# Patient Record
Sex: FEMALE | Race: WHITE | Employment: FULL TIME | URBAN - METROPOLITAN AREA
[De-identification: names, ages, dates, MRNs, and addresses within clinical notes are randomized per-mention and may not be internally consistent; named-entity substitution may affect disease eponyms.]

---

## 2017-03-20 ENCOUNTER — OFFICE VISIT (OUTPATIENT)
Dept: FAMILY MEDICINE CLINIC | Age: 47
End: 2017-03-20

## 2017-03-20 VITALS
HEART RATE: 89 BPM | WEIGHT: 159.2 LBS | SYSTOLIC BLOOD PRESSURE: 130 MMHG | HEIGHT: 62 IN | DIASTOLIC BLOOD PRESSURE: 80 MMHG | BODY MASS INDEX: 29.3 KG/M2 | OXYGEN SATURATION: 97 %

## 2017-03-20 DIAGNOSIS — E11.9 TYPE 2 DIABETES MELLITUS WITHOUT COMPLICATION, WITHOUT LONG-TERM CURRENT USE OF INSULIN (HCC): ICD-10-CM

## 2017-03-20 DIAGNOSIS — E03.9 HYPOTHYROIDISM, UNSPECIFIED TYPE: ICD-10-CM

## 2017-03-20 LAB
A/G RATIO: 1.9 (ref 1.1–2.2)
ALBUMIN SERPL-MCNC: 4.5 G/DL (ref 3.4–5)
ALP BLD-CCNC: 76 U/L (ref 40–129)
ALT SERPL-CCNC: 48 U/L (ref 10–40)
ANION GAP SERPL CALCULATED.3IONS-SCNC: 14 MMOL/L (ref 3–16)
AST SERPL-CCNC: 41 U/L (ref 15–37)
BASOPHILS ABSOLUTE: 0 K/UL (ref 0–0.2)
BASOPHILS RELATIVE PERCENT: 0.5 %
BILIRUB SERPL-MCNC: 0.3 MG/DL (ref 0–1)
BUN BLDV-MCNC: 7 MG/DL (ref 7–20)
CALCIUM SERPL-MCNC: 8.9 MG/DL (ref 8.3–10.6)
CHLORIDE BLD-SCNC: 102 MMOL/L (ref 99–110)
CHOLESTEROL, TOTAL: 138 MG/DL (ref 0–199)
CO2: 25 MMOL/L (ref 21–32)
CREAT SERPL-MCNC: 0.7 MG/DL (ref 0.6–1.1)
CREATININE URINE: 141.1 MG/DL (ref 28–259)
EOSINOPHILS ABSOLUTE: 0.1 K/UL (ref 0–0.6)
EOSINOPHILS RELATIVE PERCENT: 2.5 %
GFR AFRICAN AMERICAN: >60
GFR NON-AFRICAN AMERICAN: >60
GLOBULIN: 2.4 G/DL
GLUCOSE BLD-MCNC: 189 MG/DL (ref 70–99)
HCT VFR BLD CALC: 38.3 % (ref 36–48)
HDLC SERPL-MCNC: 24 MG/DL (ref 40–60)
HEMOGLOBIN: 12.7 G/DL (ref 12–16)
LDL CHOLESTEROL CALCULATED: ABNORMAL MG/DL
LDL CHOLESTEROL DIRECT: 52 MG/DL
LYMPHOCYTES ABSOLUTE: 2.1 K/UL (ref 1–5.1)
LYMPHOCYTES RELATIVE PERCENT: 34.2 %
MCH RBC QN AUTO: 27.1 PG (ref 26–34)
MCHC RBC AUTO-ENTMCNC: 33.2 G/DL (ref 31–36)
MCV RBC AUTO: 81.5 FL (ref 80–100)
MICROALBUMIN UR-MCNC: <1.2 MG/DL
MICROALBUMIN/CREAT UR-RTO: NORMAL MG/G (ref 0–30)
MONOCYTES ABSOLUTE: 0.3 K/UL (ref 0–1.3)
MONOCYTES RELATIVE PERCENT: 5.1 %
NEUTROPHILS ABSOLUTE: 3.5 K/UL (ref 1.7–7.7)
NEUTROPHILS RELATIVE PERCENT: 57.7 %
PDW BLD-RTO: 14.7 % (ref 12.4–15.4)
PLATELET # BLD: 235 K/UL (ref 135–450)
PMV BLD AUTO: 8 FL (ref 5–10.5)
POTASSIUM SERPL-SCNC: 4.1 MMOL/L (ref 3.5–5.1)
RBC # BLD: 4.7 M/UL (ref 4–5.2)
SODIUM BLD-SCNC: 141 MMOL/L (ref 136–145)
T4 FREE: 1.2 NG/DL (ref 0.9–1.8)
TOTAL PROTEIN: 6.9 G/DL (ref 6.4–8.2)
TRIGL SERPL-MCNC: 388 MG/DL (ref 0–150)
TSH SERPL DL<=0.05 MIU/L-ACNC: 1.94 UIU/ML (ref 0.27–4.2)
VLDLC SERPL CALC-MCNC: ABNORMAL MG/DL
WBC # BLD: 6 K/UL (ref 4–11)

## 2017-03-20 PROCEDURE — 99213 OFFICE O/P EST LOW 20 MIN: CPT | Performed by: FAMILY MEDICINE

## 2017-03-20 RX ORDER — METFORMIN HYDROCHLORIDE 500 MG/1
1000 TABLET, EXTENDED RELEASE ORAL
Qty: 360 TABLET | Refills: 3 | Status: SHIPPED | OUTPATIENT
Start: 2017-03-20 | End: 2018-07-22 | Stop reason: SDUPTHER

## 2017-03-20 ASSESSMENT — ENCOUNTER SYMPTOMS
SINUS PRESSURE: 0
RHINORRHEA: 0
EYE REDNESS: 0
APNEA: 0
STRIDOR: 0
BLOOD IN STOOL: 0
VOMITING: 0
EYE PAIN: 0
NAUSEA: 0
RECTAL PAIN: 0
FACIAL SWELLING: 0
CHEST TIGHTNESS: 0
COUGH: 0
ABDOMINAL DISTENTION: 0
ANAL BLEEDING: 0
VOICE CHANGE: 0
CHOKING: 0
PHOTOPHOBIA: 0
COLOR CHANGE: 0
SHORTNESS OF BREATH: 0
SORE THROAT: 0
CONSTIPATION: 0
BACK PAIN: 0
TROUBLE SWALLOWING: 0
EYE ITCHING: 0
EYE DISCHARGE: 0
WHEEZING: 0
DIARRHEA: 0
ABDOMINAL PAIN: 0

## 2017-03-21 LAB
ESTIMATED AVERAGE GLUCOSE: 254.7 MG/DL
HBA1C MFR BLD: 10.5 %

## 2017-06-16 ENCOUNTER — OFFICE VISIT (OUTPATIENT)
Dept: FAMILY MEDICINE CLINIC | Age: 47
End: 2017-06-16

## 2017-06-16 VITALS
OXYGEN SATURATION: 99 % | SYSTOLIC BLOOD PRESSURE: 120 MMHG | WEIGHT: 157.1 LBS | HEIGHT: 62 IN | TEMPERATURE: 98.4 F | BODY MASS INDEX: 28.91 KG/M2 | DIASTOLIC BLOOD PRESSURE: 78 MMHG | HEART RATE: 70 BPM

## 2017-06-16 DIAGNOSIS — K21.9 GASTROESOPHAGEAL REFLUX DISEASE WITHOUT ESOPHAGITIS: ICD-10-CM

## 2017-06-16 DIAGNOSIS — E11.9 TYPE 2 DIABETES MELLITUS WITHOUT COMPLICATION, WITHOUT LONG-TERM CURRENT USE OF INSULIN (HCC): ICD-10-CM

## 2017-06-16 DIAGNOSIS — I10 ESSENTIAL HYPERTENSION, BENIGN: ICD-10-CM

## 2017-06-16 DIAGNOSIS — E03.9 HYPOTHYROIDISM, UNSPECIFIED TYPE: ICD-10-CM

## 2017-06-16 DIAGNOSIS — E04.1 THYROID NODULE: ICD-10-CM

## 2017-06-16 LAB
ANION GAP SERPL CALCULATED.3IONS-SCNC: 16 MMOL/L (ref 3–16)
BUN BLDV-MCNC: 10 MG/DL (ref 7–20)
CALCIUM SERPL-MCNC: 9.5 MG/DL (ref 8.3–10.6)
CHLORIDE BLD-SCNC: 98 MMOL/L (ref 99–110)
CO2: 25 MMOL/L (ref 21–32)
CREAT SERPL-MCNC: 0.9 MG/DL (ref 0.6–1.1)
GFR AFRICAN AMERICAN: >60
GFR NON-AFRICAN AMERICAN: >60
GLUCOSE BLD-MCNC: 138 MG/DL (ref 70–99)
POTASSIUM SERPL-SCNC: 4.2 MMOL/L (ref 3.5–5.1)
SODIUM BLD-SCNC: 139 MMOL/L (ref 136–145)

## 2017-06-16 PROCEDURE — 99214 OFFICE O/P EST MOD 30 MIN: CPT | Performed by: FAMILY MEDICINE

## 2017-06-16 ASSESSMENT — ENCOUNTER SYMPTOMS
SHORTNESS OF BREATH: 0
WHEEZING: 0
APNEA: 0
EYE DISCHARGE: 0
COLOR CHANGE: 0
VOMITING: 0
SINUS PRESSURE: 0
RECTAL PAIN: 0
STRIDOR: 0
TROUBLE SWALLOWING: 0
SORE THROAT: 0
BLOOD IN STOOL: 0
RHINORRHEA: 0
NAUSEA: 0
PHOTOPHOBIA: 0
CONSTIPATION: 0
DIARRHEA: 0
EYE PAIN: 0
EYE ITCHING: 0
BACK PAIN: 0
CHEST TIGHTNESS: 0
ABDOMINAL PAIN: 0
CHOKING: 0
ABDOMINAL DISTENTION: 0
EYE REDNESS: 0
ANAL BLEEDING: 0
COUGH: 0
FACIAL SWELLING: 0
VOICE CHANGE: 0

## 2017-06-17 LAB
ESTIMATED AVERAGE GLUCOSE: 197.3 MG/DL
HBA1C MFR BLD: 8.5 %

## 2017-06-20 ENCOUNTER — TELEPHONE (OUTPATIENT)
Dept: FAMILY MEDICINE CLINIC | Age: 47
End: 2017-06-20

## 2017-06-20 DIAGNOSIS — E11.9 TYPE 2 DIABETES MELLITUS WITHOUT COMPLICATION, WITHOUT LONG-TERM CURRENT USE OF INSULIN (HCC): Primary | ICD-10-CM

## 2017-08-21 RX ORDER — LEVOTHYROXINE SODIUM 0.05 MG/1
TABLET ORAL
Qty: 90 TABLET | Refills: 1 | Status: SHIPPED | OUTPATIENT
Start: 2017-08-21 | End: 2018-04-21 | Stop reason: SDUPTHER

## 2017-08-21 RX ORDER — ATENOLOL 25 MG/1
TABLET ORAL
Qty: 90 TABLET | Refills: 1 | Status: SHIPPED | OUTPATIENT
Start: 2017-08-21 | End: 2018-04-21

## 2018-04-21 RX ORDER — ATENOLOL 25 MG/1
TABLET ORAL
Qty: 90 TABLET | Refills: 1 | Status: SHIPPED | OUTPATIENT
Start: 2018-04-21 | End: 2018-11-20 | Stop reason: SDUPTHER

## 2018-04-21 RX ORDER — LEVOTHYROXINE SODIUM 0.05 MG/1
TABLET ORAL
Qty: 90 TABLET | Refills: 0 | Status: SHIPPED | OUTPATIENT
Start: 2018-04-21 | End: 2018-07-22 | Stop reason: SDUPTHER

## 2018-07-22 RX ORDER — METFORMIN HYDROCHLORIDE 500 MG/1
TABLET, EXTENDED RELEASE ORAL
Qty: 360 TABLET | Refills: 2 | Status: SHIPPED | OUTPATIENT
Start: 2018-07-22 | End: 2019-05-28 | Stop reason: SDUPTHER

## 2018-07-23 RX ORDER — LEVOTHYROXINE SODIUM 0.05 MG/1
TABLET ORAL
Qty: 90 TABLET | Refills: 0 | Status: SHIPPED | OUTPATIENT
Start: 2018-07-23 | End: 2018-11-20 | Stop reason: SDUPTHER

## 2018-11-20 RX ORDER — LEVOTHYROXINE SODIUM 0.05 MG/1
TABLET ORAL
Qty: 90 TABLET | Refills: 0 | Status: SHIPPED | OUTPATIENT
Start: 2018-11-20 | End: 2019-03-12 | Stop reason: SDUPTHER

## 2018-11-20 RX ORDER — ATENOLOL 25 MG/1
TABLET ORAL
Qty: 90 TABLET | Refills: 0 | Status: SHIPPED | OUTPATIENT
Start: 2018-11-20 | End: 2019-03-12 | Stop reason: SDUPTHER

## 2018-11-27 ENCOUNTER — OFFICE VISIT (OUTPATIENT)
Dept: FAMILY MEDICINE CLINIC | Age: 48
End: 2018-11-27
Payer: COMMERCIAL

## 2018-11-27 VITALS
DIASTOLIC BLOOD PRESSURE: 82 MMHG | HEIGHT: 62 IN | SYSTOLIC BLOOD PRESSURE: 128 MMHG | OXYGEN SATURATION: 100 % | BODY MASS INDEX: 26.5 KG/M2 | WEIGHT: 144 LBS | HEART RATE: 94 BPM

## 2018-11-27 DIAGNOSIS — E03.9 HYPOTHYROIDISM, UNSPECIFIED TYPE: ICD-10-CM

## 2018-11-27 DIAGNOSIS — E55.9 VITAMIN D DEFICIENCY: ICD-10-CM

## 2018-11-27 DIAGNOSIS — I10 ESSENTIAL HYPERTENSION, BENIGN: ICD-10-CM

## 2018-11-27 DIAGNOSIS — K21.9 GASTROESOPHAGEAL REFLUX DISEASE WITHOUT ESOPHAGITIS: ICD-10-CM

## 2018-11-27 DIAGNOSIS — D32.9 MENINGIOMA (HCC): ICD-10-CM

## 2018-11-27 DIAGNOSIS — E11.9 TYPE 2 DIABETES MELLITUS WITHOUT COMPLICATION, WITHOUT LONG-TERM CURRENT USE OF INSULIN (HCC): ICD-10-CM

## 2018-11-27 DIAGNOSIS — Z01.818 PRE-OP EXAM: Primary | ICD-10-CM

## 2018-11-27 LAB
A/G RATIO: 2 (ref 1.1–2.2)
ALBUMIN SERPL-MCNC: 4.9 G/DL (ref 3.4–5)
ALP BLD-CCNC: 68 U/L (ref 40–129)
ALT SERPL-CCNC: 40 U/L (ref 10–40)
ANION GAP SERPL CALCULATED.3IONS-SCNC: 12 MMOL/L (ref 3–16)
AST SERPL-CCNC: 27 U/L (ref 15–37)
BASOPHILS ABSOLUTE: 0 K/UL (ref 0–0.2)
BASOPHILS RELATIVE PERCENT: 0.3 %
BILIRUB SERPL-MCNC: 0.5 MG/DL (ref 0–1)
BUN BLDV-MCNC: 15 MG/DL (ref 7–20)
CALCIUM SERPL-MCNC: 9.4 MG/DL (ref 8.3–10.6)
CHLORIDE BLD-SCNC: 102 MMOL/L (ref 99–110)
CO2: 26 MMOL/L (ref 21–32)
CREAT SERPL-MCNC: 0.8 MG/DL (ref 0.6–1.1)
CREATININE URINE: 159.7 MG/DL (ref 28–259)
EOSINOPHILS ABSOLUTE: 0.1 K/UL (ref 0–0.6)
EOSINOPHILS RELATIVE PERCENT: 1.5 %
GFR AFRICAN AMERICAN: >60
GFR NON-AFRICAN AMERICAN: >60
GLOBULIN: 2.4 G/DL
GLUCOSE BLD-MCNC: 121 MG/DL (ref 70–99)
HCT VFR BLD CALC: 39.2 % (ref 36–48)
HEMOGLOBIN: 12.8 G/DL (ref 12–16)
LYMPHOCYTES ABSOLUTE: 1.8 K/UL (ref 1–5.1)
LYMPHOCYTES RELATIVE PERCENT: 29.2 %
MCH RBC QN AUTO: 26.8 PG (ref 26–34)
MCHC RBC AUTO-ENTMCNC: 32.7 G/DL (ref 31–36)
MCV RBC AUTO: 82 FL (ref 80–100)
MICROALBUMIN UR-MCNC: <1.2 MG/DL
MICROALBUMIN/CREAT UR-RTO: NORMAL MG/G (ref 0–30)
MONOCYTES ABSOLUTE: 0.4 K/UL (ref 0–1.3)
MONOCYTES RELATIVE PERCENT: 6.2 %
NEUTROPHILS ABSOLUTE: 3.8 K/UL (ref 1.7–7.7)
NEUTROPHILS RELATIVE PERCENT: 62.8 %
PDW BLD-RTO: 14.8 % (ref 12.4–15.4)
PLATELET # BLD: 261 K/UL (ref 135–450)
PMV BLD AUTO: 7.5 FL (ref 5–10.5)
POTASSIUM SERPL-SCNC: 4.5 MMOL/L (ref 3.5–5.1)
RBC # BLD: 4.78 M/UL (ref 4–5.2)
SODIUM BLD-SCNC: 140 MMOL/L (ref 136–145)
TOTAL PROTEIN: 7.3 G/DL (ref 6.4–8.2)
TSH SERPL DL<=0.05 MIU/L-ACNC: 2.35 UIU/ML (ref 0.27–4.2)
VITAMIN D 25-HYDROXY: 21 NG/ML
WBC # BLD: 6 K/UL (ref 4–11)

## 2018-11-27 PROCEDURE — 93000 ELECTROCARDIOGRAM COMPLETE: CPT | Performed by: FAMILY MEDICINE

## 2018-11-27 PROCEDURE — 99244 OFF/OP CNSLTJ NEW/EST MOD 40: CPT | Performed by: FAMILY MEDICINE

## 2018-11-27 ASSESSMENT — PATIENT HEALTH QUESTIONNAIRE - PHQ9
SUM OF ALL RESPONSES TO PHQ QUESTIONS 1-9: 0
SUM OF ALL RESPONSES TO PHQ QUESTIONS 1-9: 0
SUM OF ALL RESPONSES TO PHQ9 QUESTIONS 1 & 2: 0
1. LITTLE INTEREST OR PLEASURE IN DOING THINGS: 0
2. FEELING DOWN, DEPRESSED OR HOPELESS: 0

## 2018-11-27 NOTE — PROGRESS NOTES
Preoperative workup as follows ECG, hemoglobin, hematocrit, electrolytes, creatinine, glucose, liver function studies, coagulation studies  2. Change in medication regimen before surgery: none, continue med regimen including morning of surgery, w/sip of water  3. Prophylaxis for cardiac events with perioperative beta-blockers: on atenol  4. Invasive hemodynamic monitoring perioperatively: not indicated  5. Deep vein thrombosis prophylaxis postoperatively:regimen to be chosen by surgical team  6. Surveillance for postoperative MI with ECG immediately postoperatively and on postoperative days 1 and 2 AND troponin levels 24 hours postoperatively and on day 4 or hospital discharge (whichever comes first): not indicated  7.  Other measures: will follow with you

## 2018-11-28 LAB
ESTIMATED AVERAGE GLUCOSE: 157.1 MG/DL
HBA1C MFR BLD: 7.1 %

## 2018-12-04 ENCOUNTER — HOSPITAL ENCOUNTER (OUTPATIENT)
Dept: MRI IMAGING | Age: 48
Discharge: HOME | End: 2018-12-04
Payer: COMMERCIAL

## 2018-12-04 DIAGNOSIS — D32.9 MENINGIOMA (HCC): ICD-10-CM

## 2018-12-04 PROCEDURE — 6360000004 HC RX CONTRAST MEDICATION: Performed by: NEUROLOGICAL SURGERY

## 2018-12-04 PROCEDURE — 70552 MRI BRAIN STEM W/DYE: CPT

## 2018-12-04 PROCEDURE — A9579 GAD-BASE MR CONTRAST NOS,1ML: HCPCS | Performed by: NEUROLOGICAL SURGERY

## 2018-12-04 RX ADMIN — GADOTERIDOL 13 ML: 279.3 INJECTION, SOLUTION INTRAVENOUS at 09:50

## 2018-12-10 ENCOUNTER — ANESTHESIA EVENT (OUTPATIENT)
Dept: OPERATING ROOM | Age: 48
DRG: 027 | End: 2018-12-10
Payer: COMMERCIAL

## 2018-12-11 ENCOUNTER — HOSPITAL ENCOUNTER (INPATIENT)
Age: 48
LOS: 2 days | Discharge: HOME | DRG: 027 | End: 2018-12-13
Attending: NEUROLOGICAL SURGERY | Admitting: NEUROLOGICAL SURGERY
Payer: COMMERCIAL

## 2018-12-11 ENCOUNTER — ANESTHESIA (OUTPATIENT)
Dept: OPERATING ROOM | Age: 48
DRG: 027 | End: 2018-12-11
Payer: COMMERCIAL

## 2018-12-11 VITALS — OXYGEN SATURATION: 100 % | DIASTOLIC BLOOD PRESSURE: 69 MMHG | SYSTOLIC BLOOD PRESSURE: 113 MMHG | TEMPERATURE: 98.4 F

## 2018-12-11 DIAGNOSIS — D32.9 MENINGIOMA (HCC): Primary | ICD-10-CM

## 2018-12-11 LAB
ABO/RH: NORMAL
ALBUMIN SERPL-MCNC: 4 G/DL (ref 3.4–5)
ANION GAP SERPL CALCULATED.3IONS-SCNC: 13 MMOL/L (ref 3–16)
ANTIBODY SCREEN: NORMAL
APTT: 36.6 SEC (ref 26–36)
BASOPHILS ABSOLUTE: 0 K/UL (ref 0–0.2)
BASOPHILS RELATIVE PERCENT: 0.1 %
BUN BLDV-MCNC: 11 MG/DL (ref 7–20)
CALCIUM SERPL-MCNC: 8.6 MG/DL (ref 8.3–10.6)
CHLORIDE BLD-SCNC: 101 MMOL/L (ref 99–110)
CO2: 23 MMOL/L (ref 21–32)
CREAT SERPL-MCNC: 0.7 MG/DL (ref 0.6–1.1)
EOSINOPHILS ABSOLUTE: 0 K/UL (ref 0–0.6)
EOSINOPHILS RELATIVE PERCENT: 0.1 %
GFR AFRICAN AMERICAN: >60
GFR NON-AFRICAN AMERICAN: >60
GLUCOSE BLD-MCNC: 114 MG/DL (ref 70–99)
GLUCOSE BLD-MCNC: 120 MG/DL (ref 70–99)
GLUCOSE BLD-MCNC: 143 MG/DL (ref 70–99)
GLUCOSE BLD-MCNC: 167 MG/DL (ref 70–99)
GLUCOSE BLD-MCNC: 213 MG/DL (ref 70–99)
GLUCOSE BLD-MCNC: 223 MG/DL (ref 70–99)
HCT VFR BLD CALC: 32.2 % (ref 36–48)
HEMOGLOBIN: 10.5 G/DL (ref 12–16)
INR BLD: 1 (ref 0.86–1.14)
LYMPHOCYTES ABSOLUTE: 0.6 K/UL (ref 1–5.1)
LYMPHOCYTES RELATIVE PERCENT: 9.8 %
MCH RBC QN AUTO: 26.8 PG (ref 26–34)
MCHC RBC AUTO-ENTMCNC: 32.7 G/DL (ref 31–36)
MCV RBC AUTO: 82.1 FL (ref 80–100)
MONOCYTES ABSOLUTE: 0.1 K/UL (ref 0–1.3)
MONOCYTES RELATIVE PERCENT: 1.4 %
NEUTROPHILS ABSOLUTE: 5.7 K/UL (ref 1.7–7.7)
NEUTROPHILS RELATIVE PERCENT: 88.6 %
PDW BLD-RTO: 14.5 % (ref 12.4–15.4)
PERFORMED ON: ABNORMAL
PHOSPHORUS: 4.1 MG/DL (ref 2.5–4.9)
PLATELET # BLD: 183 K/UL (ref 135–450)
PMV BLD AUTO: 7.7 FL (ref 5–10.5)
POTASSIUM SERPL-SCNC: 3.8 MMOL/L (ref 3.5–5.1)
PREGNANCY, URINE: NEGATIVE
PROTHROMBIN TIME: 11.4 SEC (ref 9.8–13)
RBC # BLD: 3.92 M/UL (ref 4–5.2)
SODIUM BLD-SCNC: 137 MMOL/L (ref 136–145)
WBC # BLD: 6.4 K/UL (ref 4–11)

## 2018-12-11 PROCEDURE — 88360 TUMOR IMMUNOHISTOCHEM/MANUAL: CPT

## 2018-12-11 PROCEDURE — 86901 BLOOD TYPING SEROLOGIC RH(D): CPT

## 2018-12-11 PROCEDURE — 6370000000 HC RX 637 (ALT 250 FOR IP): Performed by: NEUROLOGICAL SURGERY

## 2018-12-11 PROCEDURE — 2500000003 HC RX 250 WO HCPCS: Performed by: ANESTHESIOLOGY

## 2018-12-11 PROCEDURE — 2500000003 HC RX 250 WO HCPCS: Performed by: NEUROLOGICAL SURGERY

## 2018-12-11 PROCEDURE — 3700000001 HC ADD 15 MINUTES (ANESTHESIA): Performed by: NEUROLOGICAL SURGERY

## 2018-12-11 PROCEDURE — 6370000000 HC RX 637 (ALT 250 FOR IP): Performed by: NURSE PRACTITIONER

## 2018-12-11 PROCEDURE — 2000000000 HC ICU R&B

## 2018-12-11 PROCEDURE — 2580000003 HC RX 258: Performed by: NEUROLOGICAL SURGERY

## 2018-12-11 PROCEDURE — 85610 PROTHROMBIN TIME: CPT

## 2018-12-11 PROCEDURE — 86850 RBC ANTIBODY SCREEN: CPT

## 2018-12-11 PROCEDURE — 2580000003 HC RX 258: Performed by: ANESTHESIOLOGY

## 2018-12-11 PROCEDURE — 2500000003 HC RX 250 WO HCPCS: Performed by: NURSE ANESTHETIST, CERTIFIED REGISTERED

## 2018-12-11 PROCEDURE — 85730 THROMBOPLASTIN TIME PARTIAL: CPT

## 2018-12-11 PROCEDURE — 86900 BLOOD TYPING SEROLOGIC ABO: CPT

## 2018-12-11 PROCEDURE — 84703 CHORIONIC GONADOTROPIN ASSAY: CPT

## 2018-12-11 PROCEDURE — 6370000000 HC RX 637 (ALT 250 FOR IP)

## 2018-12-11 PROCEDURE — 6360000002 HC RX W HCPCS: Performed by: NEUROLOGICAL SURGERY

## 2018-12-11 PROCEDURE — 6360000002 HC RX W HCPCS: Performed by: NURSE ANESTHETIST, CERTIFIED REGISTERED

## 2018-12-11 PROCEDURE — 3600000014 HC SURGERY LEVEL 4 ADDTL 15MIN: Performed by: NEUROLOGICAL SURGERY

## 2018-12-11 PROCEDURE — 7100000000 HC PACU RECOVERY - FIRST 15 MIN: Performed by: NEUROLOGICAL SURGERY

## 2018-12-11 PROCEDURE — 7100000001 HC PACU RECOVERY - ADDTL 15 MIN: Performed by: NEUROLOGICAL SURGERY

## 2018-12-11 PROCEDURE — 80069 RENAL FUNCTION PANEL: CPT

## 2018-12-11 PROCEDURE — 2709999900 HC NON-CHARGEABLE SUPPLY: Performed by: NEUROLOGICAL SURGERY

## 2018-12-11 PROCEDURE — 2720000010 HC SURG SUPPLY STERILE: Performed by: NEUROLOGICAL SURGERY

## 2018-12-11 PROCEDURE — 00B70ZZ EXCISION OF CEREBRAL HEMISPHERE, OPEN APPROACH: ICD-10-PCS | Performed by: NEUROLOGICAL SURGERY

## 2018-12-11 PROCEDURE — 3700000000 HC ANESTHESIA ATTENDED CARE: Performed by: NEUROLOGICAL SURGERY

## 2018-12-11 PROCEDURE — 3600000004 HC SURGERY LEVEL 4 BASE: Performed by: NEUROLOGICAL SURGERY

## 2018-12-11 PROCEDURE — 88307 TISSUE EXAM BY PATHOLOGIST: CPT

## 2018-12-11 PROCEDURE — 2580000003 HC RX 258

## 2018-12-11 PROCEDURE — 85025 COMPLETE CBC W/AUTO DIFF WBC: CPT

## 2018-12-11 PROCEDURE — 2580000003 HC RX 258: Performed by: NURSE ANESTHETIST, CERTIFIED REGISTERED

## 2018-12-11 PROCEDURE — 2780000010 HC IMPLANT OTHER: Performed by: NEUROLOGICAL SURGERY

## 2018-12-11 PROCEDURE — C1713 ANCHOR/SCREW BN/BN,TIS/BN: HCPCS | Performed by: NEUROLOGICAL SURGERY

## 2018-12-11 PROCEDURE — 6360000002 HC RX W HCPCS: Performed by: ANESTHESIOLOGY

## 2018-12-11 DEVICE — SCREW BNE L4MM DIA1.5MM CRANIOFACIAL TI SELF DRL: Type: IMPLANTABLE DEVICE | Site: CRANIAL | Status: FUNCTIONAL

## 2018-12-11 DEVICE — PLATE BNE L12MM THK0.4MM 2 H CRANIOMAXILLOFACIAL BILAT BLU: Type: IMPLANTABLE DEVICE | Site: CRANIAL | Status: FUNCTIONAL

## 2018-12-11 DEVICE — DURA 62105 SUBSTITUTE DUREPAIR 3X3IN NCE
Type: IMPLANTABLE DEVICE | Site: CRANIAL | Status: FUNCTIONAL
Brand: DUREPAIR®

## 2018-12-11 DEVICE — SEALANT SURG 13 YR DURA AUTOSPRAY ADHERUS NUS106] SURGICAL ONE]: Type: IMPLANTABLE DEVICE | Site: CRANIAL | Status: FUNCTIONAL

## 2018-12-11 DEVICE — DIRECTINJECT ON-DEMAND HA CEMENT
Type: IMPLANTABLE DEVICE | Site: CRANIAL | Status: FUNCTIONAL
Brand: DIRECT INJECT

## 2018-12-11 DEVICE — COVER BUR H DIA17MM 6 H CRANIOMAXILLOFACIAL BLU TI: Type: IMPLANTABLE DEVICE | Site: CRANIAL | Status: FUNCTIONAL

## 2018-12-11 RX ORDER — BISACODYL 10 MG
10 SUPPOSITORY, RECTAL RECTAL DAILY PRN
Status: DISCONTINUED | OUTPATIENT
Start: 2018-12-11 | End: 2018-12-13 | Stop reason: HOSPADM

## 2018-12-11 RX ORDER — GLYCOPYRROLATE 1 MG/5 ML
SYRINGE (ML) INTRAVENOUS PRN
Status: DISCONTINUED | OUTPATIENT
Start: 2018-12-11 | End: 2018-12-11 | Stop reason: SDUPTHER

## 2018-12-11 RX ORDER — ACETAMINOPHEN 10 MG/ML
1000 INJECTION, SOLUTION INTRAVENOUS ONCE
Status: COMPLETED | OUTPATIENT
Start: 2018-12-11 | End: 2018-12-11

## 2018-12-11 RX ORDER — DEXAMETHASONE SODIUM PHOSPHATE 4 MG/ML
INJECTION, SOLUTION INTRA-ARTICULAR; INTRALESIONAL; INTRAMUSCULAR; INTRAVENOUS; SOFT TISSUE PRN
Status: DISCONTINUED | OUTPATIENT
Start: 2018-12-11 | End: 2018-12-11 | Stop reason: SDUPTHER

## 2018-12-11 RX ORDER — SODIUM CHLORIDE 0.9 % (FLUSH) 0.9 %
10 SYRINGE (ML) INJECTION PRN
Status: DISCONTINUED | OUTPATIENT
Start: 2018-12-11 | End: 2018-12-13 | Stop reason: HOSPADM

## 2018-12-11 RX ORDER — ONDANSETRON 2 MG/ML
INJECTION INTRAMUSCULAR; INTRAVENOUS PRN
Status: DISCONTINUED | OUTPATIENT
Start: 2018-12-11 | End: 2018-12-11 | Stop reason: SDUPTHER

## 2018-12-11 RX ORDER — DEXAMETHASONE 4 MG/1
4 TABLET ORAL DAILY
Status: DISCONTINUED | OUTPATIENT
Start: 2018-12-14 | End: 2018-12-13 | Stop reason: HOSPADM

## 2018-12-11 RX ORDER — MANNITOL 250 MG/ML
INJECTION, SOLUTION INTRAVENOUS PRN
Status: DISCONTINUED | OUTPATIENT
Start: 2018-12-11 | End: 2018-12-11 | Stop reason: SDUPTHER

## 2018-12-11 RX ORDER — MAGNESIUM HYDROXIDE 1200 MG/15ML
LIQUID ORAL CONTINUOUS PRN
Status: COMPLETED | OUTPATIENT
Start: 2018-12-11 | End: 2018-12-11

## 2018-12-11 RX ORDER — SODIUM CHLORIDE 0.9 % (FLUSH) 0.9 %
10 SYRINGE (ML) INJECTION EVERY 12 HOURS SCHEDULED
Status: DISCONTINUED | OUTPATIENT
Start: 2018-12-11 | End: 2018-12-13 | Stop reason: HOSPADM

## 2018-12-11 RX ORDER — OXYCODONE HYDROCHLORIDE 5 MG/1
5 TABLET ORAL EVERY 4 HOURS PRN
Status: DISCONTINUED | OUTPATIENT
Start: 2018-12-11 | End: 2018-12-13 | Stop reason: HOSPADM

## 2018-12-11 RX ORDER — MIDAZOLAM HYDROCHLORIDE 1 MG/ML
INJECTION INTRAMUSCULAR; INTRAVENOUS PRN
Status: DISCONTINUED | OUTPATIENT
Start: 2018-12-11 | End: 2018-12-11 | Stop reason: SDUPTHER

## 2018-12-11 RX ORDER — ONDANSETRON 2 MG/ML
4 INJECTION INTRAMUSCULAR; INTRAVENOUS
Status: DISCONTINUED | OUTPATIENT
Start: 2018-12-11 | End: 2018-12-11 | Stop reason: HOSPADM

## 2018-12-11 RX ORDER — ATENOLOL 25 MG/1
25 TABLET ORAL DAILY
Status: DISCONTINUED | OUTPATIENT
Start: 2018-12-11 | End: 2018-12-13 | Stop reason: HOSPADM

## 2018-12-11 RX ORDER — LIDOCAINE HYDROCHLORIDE 20 MG/ML
INJECTION, SOLUTION EPIDURAL; INFILTRATION; INTRACAUDAL; PERINEURAL PRN
Status: DISCONTINUED | OUTPATIENT
Start: 2018-12-11 | End: 2018-12-11 | Stop reason: SDUPTHER

## 2018-12-11 RX ORDER — DOCUSATE SODIUM 100 MG/1
100 CAPSULE, LIQUID FILLED ORAL 2 TIMES DAILY
Status: DISCONTINUED | OUTPATIENT
Start: 2018-12-11 | End: 2018-12-13 | Stop reason: HOSPADM

## 2018-12-11 RX ORDER — DIPHENHYDRAMINE HYDROCHLORIDE 50 MG/ML
INJECTION INTRAMUSCULAR; INTRAVENOUS PRN
Status: DISCONTINUED | OUTPATIENT
Start: 2018-12-11 | End: 2018-12-11 | Stop reason: SDUPTHER

## 2018-12-11 RX ORDER — ROCURONIUM BROMIDE 10 MG/ML
INJECTION, SOLUTION INTRAVENOUS PRN
Status: DISCONTINUED | OUTPATIENT
Start: 2018-12-11 | End: 2018-12-11 | Stop reason: SDUPTHER

## 2018-12-11 RX ORDER — PROPOFOL 10 MG/ML
INJECTION, EMULSION INTRAVENOUS CONTINUOUS PRN
Status: DISCONTINUED | OUTPATIENT
Start: 2018-12-11 | End: 2018-12-11 | Stop reason: SDUPTHER

## 2018-12-11 RX ORDER — LIDOCAINE HYDROCHLORIDE 10 MG/ML
1 INJECTION, SOLUTION EPIDURAL; INFILTRATION; INTRACAUDAL; PERINEURAL
Status: DISCONTINUED | OUTPATIENT
Start: 2018-12-11 | End: 2018-12-11 | Stop reason: HOSPADM

## 2018-12-11 RX ORDER — SODIUM CHLORIDE, SODIUM LACTATE, POTASSIUM CHLORIDE, CALCIUM CHLORIDE 600; 310; 30; 20 MG/100ML; MG/100ML; MG/100ML; MG/100ML
INJECTION, SOLUTION INTRAVENOUS CONTINUOUS
Status: DISCONTINUED | OUTPATIENT
Start: 2018-12-11 | End: 2018-12-11

## 2018-12-11 RX ORDER — LEVOTHYROXINE SODIUM 0.05 MG/1
50 TABLET ORAL DAILY
Status: DISCONTINUED | OUTPATIENT
Start: 2018-12-11 | End: 2018-12-13 | Stop reason: HOSPADM

## 2018-12-11 RX ORDER — SODIUM CHLORIDE 0.9 % (FLUSH) 0.9 %
10 SYRINGE (ML) INJECTION PRN
Status: DISCONTINUED | OUTPATIENT
Start: 2018-12-11 | End: 2018-12-11 | Stop reason: HOSPADM

## 2018-12-11 RX ORDER — NICOTINE POLACRILEX 4 MG
15 LOZENGE BUCCAL PRN
Status: DISCONTINUED | OUTPATIENT
Start: 2018-12-11 | End: 2018-12-13 | Stop reason: HOSPADM

## 2018-12-11 RX ORDER — DIPHENHYDRAMINE HCL 25 MG
25 TABLET ORAL EVERY 8 HOURS PRN
Status: DISCONTINUED | OUTPATIENT
Start: 2018-12-11 | End: 2018-12-13 | Stop reason: HOSPADM

## 2018-12-11 RX ORDER — SODIUM CHLORIDE 9 MG/ML
INJECTION, SOLUTION INTRAVENOUS
Status: COMPLETED
Start: 2018-12-11 | End: 2018-12-11

## 2018-12-11 RX ORDER — LEVETIRACETAM 10 MG/ML
INJECTION INTRAVASCULAR PRN
Status: DISCONTINUED | OUTPATIENT
Start: 2018-12-11 | End: 2018-12-11 | Stop reason: SDUPTHER

## 2018-12-11 RX ORDER — BUPIVACAINE HYDROCHLORIDE 5 MG/ML
INJECTION, SOLUTION EPIDURAL; INTRACAUDAL PRN
Status: DISCONTINUED | OUTPATIENT
Start: 2018-12-11 | End: 2018-12-11 | Stop reason: HOSPADM

## 2018-12-11 RX ORDER — HYDROMORPHONE HCL 110MG/55ML
PATIENT CONTROLLED ANALGESIA SYRINGE INTRAVENOUS PRN
Status: DISCONTINUED | OUTPATIENT
Start: 2018-12-11 | End: 2018-12-11 | Stop reason: SDUPTHER

## 2018-12-11 RX ORDER — ONDANSETRON 2 MG/ML
4 INJECTION INTRAMUSCULAR; INTRAVENOUS EVERY 6 HOURS PRN
Status: DISCONTINUED | OUTPATIENT
Start: 2018-12-11 | End: 2018-12-13 | Stop reason: HOSPADM

## 2018-12-11 RX ORDER — CEFAZOLIN SODIUM 2 G/50ML
2 SOLUTION INTRAVENOUS EVERY 8 HOURS
Status: DISCONTINUED | OUTPATIENT
Start: 2018-12-11 | End: 2018-12-11 | Stop reason: RX

## 2018-12-11 RX ORDER — CEFAZOLIN SODIUM 2 G/50ML
2 SOLUTION INTRAVENOUS ONCE
Status: COMPLETED | OUTPATIENT
Start: 2018-12-11 | End: 2018-12-11

## 2018-12-11 RX ORDER — PROMETHAZINE HYDROCHLORIDE 25 MG/ML
6.25 INJECTION, SOLUTION INTRAMUSCULAR; INTRAVENOUS
Status: DISCONTINUED | OUTPATIENT
Start: 2018-12-11 | End: 2018-12-11 | Stop reason: HOSPADM

## 2018-12-11 RX ORDER — PANTOPRAZOLE SODIUM 40 MG/1
40 TABLET, DELAYED RELEASE ORAL
Status: DISCONTINUED | OUTPATIENT
Start: 2018-12-12 | End: 2018-12-13 | Stop reason: HOSPADM

## 2018-12-11 RX ORDER — ACETAMINOPHEN 325 MG/1
650 TABLET ORAL EVERY 4 HOURS PRN
Status: DISCONTINUED | OUTPATIENT
Start: 2018-12-11 | End: 2018-12-13 | Stop reason: HOSPADM

## 2018-12-11 RX ORDER — METHOCARBAMOL 750 MG/1
750 TABLET, FILM COATED ORAL EVERY 6 HOURS PRN
Status: DISCONTINUED | OUTPATIENT
Start: 2018-12-12 | End: 2018-12-13 | Stop reason: HOSPADM

## 2018-12-11 RX ORDER — GINSENG 100 MG
CAPSULE ORAL PRN
Status: DISCONTINUED | OUTPATIENT
Start: 2018-12-11 | End: 2018-12-11 | Stop reason: HOSPADM

## 2018-12-11 RX ORDER — DEXAMETHASONE 4 MG/1
4 TABLET ORAL EVERY 8 HOURS SCHEDULED
Status: COMPLETED | OUTPATIENT
Start: 2018-12-11 | End: 2018-12-12

## 2018-12-11 RX ORDER — SODIUM CHLORIDE 9 MG/ML
INJECTION, SOLUTION INTRAVENOUS CONTINUOUS
Status: DISCONTINUED | OUTPATIENT
Start: 2018-12-11 | End: 2018-12-12

## 2018-12-11 RX ORDER — DEXAMETHASONE 4 MG/1
4 TABLET ORAL EVERY 12 HOURS SCHEDULED
Status: COMPLETED | OUTPATIENT
Start: 2018-12-12 | End: 2018-12-13

## 2018-12-11 RX ORDER — SODIUM CHLORIDE 0.9 % (FLUSH) 0.9 %
10 SYRINGE (ML) INJECTION EVERY 12 HOURS SCHEDULED
Status: DISCONTINUED | OUTPATIENT
Start: 2018-12-11 | End: 2018-12-11 | Stop reason: HOSPADM

## 2018-12-11 RX ORDER — PROPOFOL 10 MG/ML
INJECTION, EMULSION INTRAVENOUS PRN
Status: DISCONTINUED | OUTPATIENT
Start: 2018-12-11 | End: 2018-12-11 | Stop reason: SDUPTHER

## 2018-12-11 RX ORDER — SODIUM CHLORIDE 9 MG/ML
INJECTION, SOLUTION INTRAVENOUS CONTINUOUS PRN
Status: DISCONTINUED | OUTPATIENT
Start: 2018-12-11 | End: 2018-12-11 | Stop reason: SDUPTHER

## 2018-12-11 RX ORDER — METFORMIN HYDROCHLORIDE 500 MG/1
1000 TABLET, EXTENDED RELEASE ORAL 2 TIMES DAILY WITH MEALS
Status: DISCONTINUED | OUTPATIENT
Start: 2018-12-11 | End: 2018-12-11

## 2018-12-11 RX ORDER — DEXTROSE MONOHYDRATE 25 G/50ML
12.5 INJECTION, SOLUTION INTRAVENOUS PRN
Status: DISCONTINUED | OUTPATIENT
Start: 2018-12-11 | End: 2018-12-13 | Stop reason: HOSPADM

## 2018-12-11 RX ORDER — DEXTROSE MONOHYDRATE 50 MG/ML
100 INJECTION, SOLUTION INTRAVENOUS PRN
Status: DISCONTINUED | OUTPATIENT
Start: 2018-12-11 | End: 2018-12-13 | Stop reason: HOSPADM

## 2018-12-11 RX ADMIN — SUGAMMADEX 100 MG: 100 INJECTION, SOLUTION INTRAVENOUS at 10:05

## 2018-12-11 RX ADMIN — MIDAZOLAM HYDROCHLORIDE 2 MG: 1 INJECTION INTRAMUSCULAR; INTRAVENOUS at 07:20

## 2018-12-11 RX ADMIN — REMIFENTANIL HYDROCHLORIDE 0.1 MCG/KG/MIN: 1 INJECTION, POWDER, LYOPHILIZED, FOR SOLUTION INTRAVENOUS at 07:50

## 2018-12-11 RX ADMIN — DEXAMETHASONE 4 MG: 4 TABLET ORAL at 21:22

## 2018-12-11 RX ADMIN — PROPOFOL 100 MG: 10 INJECTION, EMULSION INTRAVENOUS at 07:57

## 2018-12-11 RX ADMIN — ONDANSETRON 4 MG: 2 INJECTION INTRAMUSCULAR; INTRAVENOUS at 09:46

## 2018-12-11 RX ADMIN — SODIUM CHLORIDE: 9 INJECTION, SOLUTION INTRAVENOUS at 13:00

## 2018-12-11 RX ADMIN — PROPOFOL 100 MCG/KG/MIN: 10 INJECTION, EMULSION INTRAVENOUS at 07:50

## 2018-12-11 RX ADMIN — HYDROMORPHONE HYDROCHLORIDE 0.5 MG: 2 INJECTION, SOLUTION INTRAMUSCULAR; INTRAVENOUS; SUBCUTANEOUS at 08:20

## 2018-12-11 RX ADMIN — SODIUM CHLORIDE, SODIUM LACTATE, POTASSIUM CHLORIDE, AND CALCIUM CHLORIDE: 600; 310; 30; 20 INJECTION, SOLUTION INTRAVENOUS at 09:35

## 2018-12-11 RX ADMIN — SODIUM CHLORIDE, SODIUM LACTATE, POTASSIUM CHLORIDE, AND CALCIUM CHLORIDE: 600; 310; 30; 20 INJECTION, SOLUTION INTRAVENOUS at 06:45

## 2018-12-11 RX ADMIN — ACETAMINOPHEN 650 MG: 325 TABLET ORAL at 12:59

## 2018-12-11 RX ADMIN — DIPHENHYDRAMINE HCL 25 MG: 25 TABLET ORAL at 23:59

## 2018-12-11 RX ADMIN — SODIUM CHLORIDE: 9 INJECTION, SOLUTION INTRAVENOUS at 21:28

## 2018-12-11 RX ADMIN — LEVETIRACETAM 1000 MG: 10 INJECTION INTRAVENOUS at 08:08

## 2018-12-11 RX ADMIN — OXYCODONE HYDROCHLORIDE 5 MG: 5 TABLET ORAL at 20:08

## 2018-12-11 RX ADMIN — PROPOFOL 200 MG: 10 INJECTION, EMULSION INTRAVENOUS at 07:31

## 2018-12-11 RX ADMIN — SODIUM CHLORIDE, POTASSIUM CHLORIDE, SODIUM LACTATE AND CALCIUM CHLORIDE: 600; 310; 30; 20 INJECTION, SOLUTION INTRAVENOUS at 07:14

## 2018-12-11 RX ADMIN — INSULIN LISPRO 2 UNITS: 100 INJECTION, SOLUTION INTRAVENOUS; SUBCUTANEOUS at 16:57

## 2018-12-11 RX ADMIN — CEFAZOLIN SODIUM 2 G: 2 SOLUTION INTRAVENOUS at 07:51

## 2018-12-11 RX ADMIN — DEXAMETHASONE 4 MG: 4 TABLET ORAL at 13:34

## 2018-12-11 RX ADMIN — DOCUSATE SODIUM 100 MG: 100 CAPSULE, LIQUID FILLED ORAL at 21:22

## 2018-12-11 RX ADMIN — CHOLECALCIFEROL TAB 25 MCG (1000 UNIT) 1000 UNITS: 25 TAB at 13:34

## 2018-12-11 RX ADMIN — DEXAMETHASONE SODIUM PHOSPHATE 10 MG: 4 INJECTION, SOLUTION INTRAMUSCULAR; INTRAVENOUS at 08:04

## 2018-12-11 RX ADMIN — ROCURONIUM BROMIDE 30 MG: 10 INJECTION, SOLUTION INTRAVENOUS at 07:31

## 2018-12-11 RX ADMIN — LIDOCAINE HYDROCHLORIDE 100 MG: 20 INJECTION, SOLUTION EPIDURAL; INFILTRATION; INTRACAUDAL; PERINEURAL at 07:31

## 2018-12-11 RX ADMIN — CEFAZOLIN SODIUM 2 G: 10 POWDER, FOR SOLUTION INTRAVENOUS at 15:16

## 2018-12-11 RX ADMIN — Medication 0.2 MG: at 07:20

## 2018-12-11 RX ADMIN — OXYCODONE HYDROCHLORIDE 5 MG: 5 TABLET ORAL at 15:42

## 2018-12-11 RX ADMIN — HYDROMORPHONE HYDROCHLORIDE 0.5 MG: 2 INJECTION, SOLUTION INTRAMUSCULAR; INTRAVENOUS; SUBCUTANEOUS at 08:25

## 2018-12-11 RX ADMIN — LEVOTHYROXINE SODIUM 50 MCG: 50 TABLET ORAL at 13:34

## 2018-12-11 RX ADMIN — MANNITOL 37.5 G: 12.5 INJECTION, SOLUTION INTRAVENOUS at 08:17

## 2018-12-11 RX ADMIN — DIPHENHYDRAMINE HYDROCHLORIDE 12.5 MG: 50 INJECTION, SOLUTION INTRAMUSCULAR; INTRAVENOUS at 08:04

## 2018-12-11 RX ADMIN — METHOCARBAMOL 1000 MG: 100 INJECTION, SOLUTION INTRAMUSCULAR; INTRAVENOUS at 11:06

## 2018-12-11 RX ADMIN — METHOCARBAMOL 1000 MG: 100 INJECTION, SOLUTION INTRAMUSCULAR; INTRAVENOUS at 19:55

## 2018-12-11 RX ADMIN — ACETAMINOPHEN 1000 MG: 10 INJECTION, SOLUTION INTRAVENOUS at 09:49

## 2018-12-11 RX ADMIN — DOCUSATE SODIUM 100 MG: 100 CAPSULE, LIQUID FILLED ORAL at 13:34

## 2018-12-11 RX ADMIN — Medication 0.2 MG: at 09:07

## 2018-12-11 RX ADMIN — Medication 10 ML: at 21:23

## 2018-12-11 RX ADMIN — OXYCODONE HYDROCHLORIDE 5 MG: 5 TABLET ORAL at 23:59

## 2018-12-11 RX ADMIN — CEFAZOLIN SODIUM 2 G: 10 POWDER, FOR SOLUTION INTRAVENOUS at 23:57

## 2018-12-11 RX ADMIN — SODIUM CHLORIDE: 900 INJECTION, SOLUTION INTRAVENOUS at 07:40

## 2018-12-11 RX ADMIN — SODIUM CHLORIDE 500 ML: 9 INJECTION, SOLUTION INTRAVENOUS at 22:00

## 2018-12-11 RX ADMIN — DIPHENHYDRAMINE HCL 25 MG: 25 TABLET ORAL at 15:42

## 2018-12-11 RX ADMIN — PROPOFOL: 10 INJECTION, EMULSION INTRAVENOUS at 08:35

## 2018-12-11 RX ADMIN — HYDROMORPHONE HYDROCHLORIDE 0.5 MG: 2 INJECTION, SOLUTION INTRAMUSCULAR; INTRAVENOUS; SUBCUTANEOUS at 08:10

## 2018-12-11 RX ADMIN — ATENOLOL 25 MG: 25 TABLET ORAL at 13:39

## 2018-12-11 ASSESSMENT — PULMONARY FUNCTION TESTS
PIF_VALUE: 2
PIF_VALUE: 20
PIF_VALUE: 21
PIF_VALUE: 19
PIF_VALUE: 21
PIF_VALUE: 18
PIF_VALUE: 18
PIF_VALUE: 21
PIF_VALUE: 18
PIF_VALUE: 20
PIF_VALUE: 22
PIF_VALUE: 20
PIF_VALUE: 3
PIF_VALUE: 19
PIF_VALUE: 21
PIF_VALUE: 22
PIF_VALUE: 22
PIF_VALUE: 19
PIF_VALUE: 19
PIF_VALUE: 20
PIF_VALUE: 18
PIF_VALUE: 1
PIF_VALUE: 18
PIF_VALUE: 2
PIF_VALUE: 22
PIF_VALUE: 21
PIF_VALUE: 18
PIF_VALUE: 19
PIF_VALUE: 1
PIF_VALUE: 19
PIF_VALUE: 18
PIF_VALUE: 21
PIF_VALUE: 20
PIF_VALUE: 3
PIF_VALUE: 19
PIF_VALUE: 21
PIF_VALUE: 20
PIF_VALUE: 19
PIF_VALUE: 21
PIF_VALUE: 19
PIF_VALUE: 19
PIF_VALUE: 20
PIF_VALUE: 21
PIF_VALUE: 21
PIF_VALUE: 1
PIF_VALUE: 20
PIF_VALUE: 18
PIF_VALUE: 21
PIF_VALUE: 21
PIF_VALUE: 18
PIF_VALUE: 18
PIF_VALUE: 22
PIF_VALUE: 22
PIF_VALUE: 18
PIF_VALUE: 3
PIF_VALUE: 1
PIF_VALUE: 18
PIF_VALUE: 3
PIF_VALUE: 3
PIF_VALUE: 17
PIF_VALUE: 19
PIF_VALUE: 20
PIF_VALUE: 21
PIF_VALUE: 19
PIF_VALUE: 21
PIF_VALUE: 22
PIF_VALUE: 19
PIF_VALUE: 21
PIF_VALUE: 21
PIF_VALUE: 19
PIF_VALUE: 19
PIF_VALUE: 18
PIF_VALUE: 21
PIF_VALUE: 21
PIF_VALUE: 18
PIF_VALUE: 22
PIF_VALUE: 19
PIF_VALUE: 21
PIF_VALUE: 1
PIF_VALUE: 21
PIF_VALUE: 19
PIF_VALUE: 9
PIF_VALUE: 21
PIF_VALUE: 18
PIF_VALUE: 21
PIF_VALUE: 2
PIF_VALUE: 2
PIF_VALUE: 24
PIF_VALUE: 21
PIF_VALUE: 19
PIF_VALUE: 20
PIF_VALUE: 18
PIF_VALUE: 21
PIF_VALUE: 18
PIF_VALUE: 18
PIF_VALUE: 20
PIF_VALUE: 21
PIF_VALUE: 18
PIF_VALUE: 21
PIF_VALUE: 18
PIF_VALUE: 20
PIF_VALUE: 19
PIF_VALUE: 17
PIF_VALUE: 18
PIF_VALUE: 21
PIF_VALUE: 20
PIF_VALUE: 19
PIF_VALUE: 20
PIF_VALUE: 13
PIF_VALUE: 18
PIF_VALUE: 22
PIF_VALUE: 1
PIF_VALUE: 18
PIF_VALUE: 19
PIF_VALUE: 17
PIF_VALUE: 22
PIF_VALUE: 19
PIF_VALUE: 3
PIF_VALUE: 17
PIF_VALUE: 20
PIF_VALUE: 22
PIF_VALUE: 21
PIF_VALUE: 17
PIF_VALUE: 19
PIF_VALUE: 1
PIF_VALUE: 17
PIF_VALUE: 0
PIF_VALUE: 1
PIF_VALUE: 19
PIF_VALUE: 18
PIF_VALUE: 20
PIF_VALUE: 21
PIF_VALUE: 19
PIF_VALUE: 18
PIF_VALUE: 21
PIF_VALUE: 3
PIF_VALUE: 18
PIF_VALUE: 19
PIF_VALUE: 21
PIF_VALUE: 19
PIF_VALUE: 21
PIF_VALUE: 22
PIF_VALUE: 21
PIF_VALUE: 17
PIF_VALUE: 21
PIF_VALUE: 21
PIF_VALUE: 19
PIF_VALUE: 19
PIF_VALUE: 22
PIF_VALUE: 2
PIF_VALUE: 19
PIF_VALUE: 19
PIF_VALUE: 2
PIF_VALUE: 20
PIF_VALUE: 2
PIF_VALUE: 3
PIF_VALUE: 21
PIF_VALUE: 19
PIF_VALUE: 22
PIF_VALUE: 18
PIF_VALUE: 20
PIF_VALUE: 19
PIF_VALUE: 20
PIF_VALUE: 20
PIF_VALUE: 19
PIF_VALUE: 18
PIF_VALUE: 19
PIF_VALUE: 21
PIF_VALUE: 18
PIF_VALUE: 22

## 2018-12-11 ASSESSMENT — PAIN DESCRIPTION - PROGRESSION
CLINICAL_PROGRESSION: GRADUALLY WORSENING
CLINICAL_PROGRESSION: NOT CHANGED
CLINICAL_PROGRESSION: GRADUALLY WORSENING

## 2018-12-11 ASSESSMENT — PAIN SCALES - GENERAL
PAINLEVEL_OUTOF10: 0
PAINLEVEL_OUTOF10: 5
PAINLEVEL_OUTOF10: 1
PAINLEVEL_OUTOF10: 2
PAINLEVEL_OUTOF10: 2
PAINLEVEL_OUTOF10: 3
PAINLEVEL_OUTOF10: 0
PAINLEVEL_OUTOF10: 3
PAINLEVEL_OUTOF10: 3

## 2018-12-11 ASSESSMENT — PAIN DESCRIPTION - FREQUENCY
FREQUENCY: CONTINUOUS
FREQUENCY: INTERMITTENT
FREQUENCY: CONTINUOUS
FREQUENCY: INTERMITTENT
FREQUENCY: CONTINUOUS

## 2018-12-11 ASSESSMENT — PAIN DESCRIPTION - DIRECTION
RADIATING_TOWARDS: LEFT JAW

## 2018-12-11 ASSESSMENT — PAIN DESCRIPTION - ORIENTATION
ORIENTATION: LEFT

## 2018-12-11 ASSESSMENT — PAIN DESCRIPTION - LOCATION
LOCATION: HEAD

## 2018-12-11 ASSESSMENT — PAIN DESCRIPTION - DESCRIPTORS
DESCRIPTORS: ACHING;DULL
DESCRIPTORS: ACHING;THROBBING
DESCRIPTORS: ACHING;THROBBING
DESCRIPTORS: THROBBING
DESCRIPTORS: ACHING;DULL

## 2018-12-11 ASSESSMENT — PAIN DESCRIPTION - PAIN TYPE
TYPE: SURGICAL PAIN

## 2018-12-11 ASSESSMENT — PAIN - FUNCTIONAL ASSESSMENT: PAIN_FUNCTIONAL_ASSESSMENT: 0-10

## 2018-12-11 ASSESSMENT — ACTIVITIES OF DAILY LIVING (ADL): EFFECT OF PAIN ON DAILY ACTIVITIES: SLEEPING

## 2018-12-11 ASSESSMENT — PAIN DESCRIPTION - ONSET
ONSET: AWAKENED FROM SLEEP
ONSET: GRADUAL
ONSET: ON-GOING

## 2018-12-11 NOTE — ANESTHESIA PRE PROCEDURE
products. Plan discussed with CRNA.     Attending anesthesiologist reviewed and agrees with Zeinab Dumont MD   12/11/2018

## 2018-12-11 NOTE — OP NOTE
secured with Synthes Craniofacial miniplates and self-tapping 4mm screws. DirectInject was used to fill in the craniotomy defect. The wound was copiously irrigated with Bacitracin solution and the temporalis muscle was closed by reapproximating both the muscle and fascia to the myofascial cuff in separate layers using interrupted 0 Vicryl sutures. The galeal layer was then closed with 2-0 Vicryl sutures and the skin was closed with staples. The wound was dressed with antibiotic ointment, Telfa, and Medipore tape. All needle, sponge, and instrument counts were correct. Notably, SSEPs, MEPs, and EEG remained stable for the duration of the case. The patient was taken out of [place] 3-pin fixation, extubated, and taken to the postoperative care unit in stable condition. I affirm in accordance with CMS regulations that I was present and scrubbed for all critical portions of the case. DRAINS: None    IMPLANTS:  1. Synthes cranial plating system    SPECIMEN:  1. Left frontal tumor for permanent histology    COMPLICATIONS: None. DISPOSITION:  ICU in stable condition.

## 2018-12-12 ENCOUNTER — APPOINTMENT (OUTPATIENT)
Dept: MRI IMAGING | Age: 48
DRG: 027 | End: 2018-12-12
Attending: NEUROLOGICAL SURGERY
Payer: COMMERCIAL

## 2018-12-12 ENCOUNTER — APPOINTMENT (OUTPATIENT)
Dept: CT IMAGING | Age: 48
DRG: 027 | End: 2018-12-12
Attending: NEUROLOGICAL SURGERY
Payer: COMMERCIAL

## 2018-12-12 LAB
ANION GAP SERPL CALCULATED.3IONS-SCNC: 15 MMOL/L (ref 3–16)
BUN BLDV-MCNC: 11 MG/DL (ref 7–20)
CALCIUM SERPL-MCNC: 8.8 MG/DL (ref 8.3–10.6)
CHLORIDE BLD-SCNC: 106 MMOL/L (ref 99–110)
CO2: 21 MMOL/L (ref 21–32)
CREAT SERPL-MCNC: 0.8 MG/DL (ref 0.6–1.1)
GFR AFRICAN AMERICAN: >60
GFR NON-AFRICAN AMERICAN: >60
GLUCOSE BLD-MCNC: 184 MG/DL (ref 70–99)
GLUCOSE BLD-MCNC: 259 MG/DL (ref 70–99)
GLUCOSE BLD-MCNC: 282 MG/DL (ref 70–99)
GLUCOSE BLD-MCNC: 290 MG/DL (ref 70–99)
GLUCOSE BLD-MCNC: 323 MG/DL (ref 70–99)
HCT VFR BLD CALC: 30.7 % (ref 36–48)
HEMOGLOBIN: 10.2 G/DL (ref 12–16)
MCH RBC QN AUTO: 27.3 PG (ref 26–34)
MCHC RBC AUTO-ENTMCNC: 33.2 G/DL (ref 31–36)
MCV RBC AUTO: 82.3 FL (ref 80–100)
PDW BLD-RTO: 14.7 % (ref 12.4–15.4)
PERFORMED ON: ABNORMAL
PLATELET # BLD: 174 K/UL (ref 135–450)
PMV BLD AUTO: 7.9 FL (ref 5–10.5)
POTASSIUM SERPL-SCNC: 3.8 MMOL/L (ref 3.5–5.1)
RBC # BLD: 3.73 M/UL (ref 4–5.2)
SODIUM BLD-SCNC: 142 MMOL/L (ref 136–145)
WBC # BLD: 7.9 K/UL (ref 4–11)

## 2018-12-12 PROCEDURE — 99232 SBSQ HOSP IP/OBS MODERATE 35: CPT | Performed by: FAMILY MEDICINE

## 2018-12-12 PROCEDURE — 6360000002 HC RX W HCPCS: Performed by: NEUROLOGICAL SURGERY

## 2018-12-12 PROCEDURE — 70553 MRI BRAIN STEM W/O & W/DYE: CPT

## 2018-12-12 PROCEDURE — G8979 MOBILITY GOAL STATUS: HCPCS

## 2018-12-12 PROCEDURE — G8978 MOBILITY CURRENT STATUS: HCPCS

## 2018-12-12 PROCEDURE — 6370000000 HC RX 637 (ALT 250 FOR IP): Performed by: NEUROLOGICAL SURGERY

## 2018-12-12 PROCEDURE — 80048 BASIC METABOLIC PNL TOTAL CA: CPT

## 2018-12-12 PROCEDURE — 6370000000 HC RX 637 (ALT 250 FOR IP): Performed by: NURSE PRACTITIONER

## 2018-12-12 PROCEDURE — 94664 DEMO&/EVAL PT USE INHALER: CPT

## 2018-12-12 PROCEDURE — 6370000000 HC RX 637 (ALT 250 FOR IP): Performed by: STUDENT IN AN ORGANIZED HEALTH CARE EDUCATION/TRAINING PROGRAM

## 2018-12-12 PROCEDURE — 1200000000 HC SEMI PRIVATE

## 2018-12-12 PROCEDURE — 94761 N-INVAS EAR/PLS OXIMETRY MLT: CPT

## 2018-12-12 PROCEDURE — 2580000003 HC RX 258: Performed by: NEUROLOGICAL SURGERY

## 2018-12-12 PROCEDURE — 6360000004 HC RX CONTRAST MEDICATION: Performed by: NEUROLOGICAL SURGERY

## 2018-12-12 PROCEDURE — G8980 MOBILITY D/C STATUS: HCPCS

## 2018-12-12 PROCEDURE — A9579 GAD-BASE MR CONTRAST NOS,1ML: HCPCS | Performed by: NEUROLOGICAL SURGERY

## 2018-12-12 PROCEDURE — 94150 VITAL CAPACITY TEST: CPT

## 2018-12-12 PROCEDURE — 85027 COMPLETE CBC AUTOMATED: CPT

## 2018-12-12 RX ADMIN — ATENOLOL 25 MG: 25 TABLET ORAL at 09:52

## 2018-12-12 RX ADMIN — INSULIN LISPRO 3 UNITS: 100 INJECTION, SOLUTION INTRAVENOUS; SUBCUTANEOUS at 06:07

## 2018-12-12 RX ADMIN — INSULIN LISPRO 9 UNITS: 100 INJECTION, SOLUTION INTRAVENOUS; SUBCUTANEOUS at 17:36

## 2018-12-12 RX ADMIN — DOCUSATE SODIUM 100 MG: 100 CAPSULE, LIQUID FILLED ORAL at 09:52

## 2018-12-12 RX ADMIN — ACETAMINOPHEN 650 MG: 325 TABLET ORAL at 20:00

## 2018-12-12 RX ADMIN — DEXAMETHASONE 4 MG: 4 TABLET ORAL at 13:54

## 2018-12-12 RX ADMIN — CHOLECALCIFEROL TAB 25 MCG (1000 UNIT) 1000 UNITS: 25 TAB at 09:52

## 2018-12-12 RX ADMIN — ACETAMINOPHEN 650 MG: 325 TABLET ORAL at 13:52

## 2018-12-12 RX ADMIN — METHOCARBAMOL 1000 MG: 100 INJECTION, SOLUTION INTRAMUSCULAR; INTRAVENOUS at 03:02

## 2018-12-12 RX ADMIN — Medication 10 ML: at 19:47

## 2018-12-12 RX ADMIN — DEXAMETHASONE 4 MG: 4 TABLET ORAL at 06:06

## 2018-12-12 RX ADMIN — LEVOTHYROXINE SODIUM 50 MCG: 50 TABLET ORAL at 07:39

## 2018-12-12 RX ADMIN — PANTOPRAZOLE SODIUM 40 MG: 40 TABLET, DELAYED RELEASE ORAL at 06:06

## 2018-12-12 RX ADMIN — INSULIN LISPRO 3 UNITS: 100 INJECTION, SOLUTION INTRAVENOUS; SUBCUTANEOUS at 12:32

## 2018-12-12 RX ADMIN — INSULIN LISPRO 5 UNITS: 100 INJECTION, SOLUTION INTRAVENOUS; SUBCUTANEOUS at 19:46

## 2018-12-12 RX ADMIN — GADOTERIDOL 14 ML: 279.3 INJECTION, SOLUTION INTRAVENOUS at 09:37

## 2018-12-12 RX ADMIN — OXYCODONE HYDROCHLORIDE 5 MG: 5 TABLET ORAL at 03:57

## 2018-12-12 ASSESSMENT — PAIN SCALES - GENERAL
PAINLEVEL_OUTOF10: 0
PAINLEVEL_OUTOF10: 3
PAINLEVEL_OUTOF10: 7
PAINLEVEL_OUTOF10: 0
PAINLEVEL_OUTOF10: 4

## 2018-12-12 ASSESSMENT — PAIN DESCRIPTION - PROGRESSION
CLINICAL_PROGRESSION: GRADUALLY WORSENING

## 2018-12-12 ASSESSMENT — PAIN DESCRIPTION - FREQUENCY
FREQUENCY: INTERMITTENT
FREQUENCY: INTERMITTENT
FREQUENCY: CONTINUOUS

## 2018-12-12 ASSESSMENT — PAIN DESCRIPTION - PAIN TYPE
TYPE: SURGICAL PAIN

## 2018-12-12 ASSESSMENT — PAIN DESCRIPTION - ONSET
ONSET: GRADUAL
ONSET: ON-GOING
ONSET: PROGRESSIVE

## 2018-12-12 ASSESSMENT — PAIN DESCRIPTION - LOCATION
LOCATION: HEAD

## 2018-12-12 ASSESSMENT — PAIN DESCRIPTION - DESCRIPTORS
DESCRIPTORS: ACHING;THROBBING
DESCRIPTORS: ACHING;DULL
DESCRIPTORS: ACHING

## 2018-12-12 ASSESSMENT — PAIN DESCRIPTION - ORIENTATION
ORIENTATION: LEFT
ORIENTATION: LEFT
ORIENTATION: LEFT;ANTERIOR

## 2018-12-12 ASSESSMENT — ACTIVITIES OF DAILY LIVING (ADL): EFFECT OF PAIN ON DAILY ACTIVITIES: RESTING

## 2018-12-12 ASSESSMENT — PAIN DESCRIPTION - DIRECTION
RADIATING_TOWARDS: LEFT JAW
RADIATING_TOWARDS: LEFT JAW

## 2018-12-12 NOTE — PROGRESS NOTES
NEUROSURGERY ICU HANDOFF    Patient Information  Name:ZO Ricardo PNU:[id number]   :1970 Code Status:No Order   Allergies   Allergen Reactions    Fentanyl Itching    Hydrocodone Itching    Oxycodone-Acetaminophen Itching    Phenergan [Promethazine Hcl] Nausea Only    Sulfa Antibiotics      Red dots on face    Tramadol Itching    Extended Emergency Contact Information  Primary Emergency Contact: Jama Swartz  Address: [address] [place] [address], [facility] [facility] [address] Phone: [phone]  Work Phone: [phone]  Relation: Spouse     Admission Information  Date of Admission:2018  5:33 AM Location:[id number]   Admission [facility] Attending Physician: Keena Sena MD   Procedure(s) (LRB):  LEFT FRONTAL CRANIOTOMY FOR RESECTION OF TUMOR (Left) Neurosurgeon: Keena Sena MD     Patient Summary  Tobi Leija is a 46 y.o. female patient with MENINGIOMA who under went a Procedure(s) (LRB):  LEFT FRONTAL CRANIOTOMY FOR RESECTION OF TUMOR (Left) today by Keena Sena MD.    History of Present Illness:  Patient had pre-op complaints of enlarging meningioma that was unresponsive to conservative treatments. Vital Signs:  /76   Pulse 74   Temp 98.1 °F (36.7 °C)   Resp 11   Ht 5' 2\" (1.575 m)   Wt 144 lb (65.3 kg)   LMP 2018 (Approximate)   SpO2 98%   BMI 26.34 kg/m²     Action List  To Do  1. Neurologic exams: Q1H until otherwise directed  2. Imaging:  - CT Head wo- @1600  - MRI Brain w wo-  POD#1 in the AM  3. Labs: BMP, CBC  4. HTN:  - Maintain SBP <140  - If PRN med insufficient, then may start Nicardipine infusion  5. Anticoagulation: Hold all full dose meds for 2 weeks  6. Cerebral edema:  - Decadron taper starting at 4 mg Q6H  - Keep HOB >30 degrees  - NO dextrose in IVF's or in IV drips  7. GI Prophylaxis: Protonix  8. Antibiotics: Cefazolin x 2 doses post op  9. Casey: DC within 24 hours post-op  10.  Bowel Regimen:
PACU Transfer Note    Vitals:    12/11/18 1200   BP: 120/76   Pulse: 74   Resp: 11   Temp: 98.1 °F (36.7 °C)   SpO2: 98%       In: 2431 [I.V.:2431]  Out: 1200 [Urine:1000]   VS stable. Pain level 3/10 tolerable. Patient neuro checks WNL. Report called to [facility] [place] ICU. Patient to transfer to room # [id number] as scheduled. Family updated.   Pain Level: 0    Report given to Receiving unit RN.    12/11/2018 12:12 PM
Pt arrived from OR, s/p LEFT FRONTAL CRANIOTOMY FOR RESECTION OF TUMOR (Left ), report received form CRNA, pt sleepy but awakens easily, denies pain at this time. Pt has multiple allergies to narcotics, received 1.5 mg of dilaudid in the OR and ofirmcathy masonadryl also given. ARMIN, pt also puente X 4 to command.   Pt has sensation bilat face to touch
   Audrey Sandhu, APRN-CNP  Neurosurgery Nurse Practitioner  [phone]    Patient was seen and examined with Harjeet Simmons who agrees with above assessment and plan. Electronically signed by: Morgan Pan, 12/12/2018 9:32 AM         ____________________________________________    I saw and examined Collette Carroll on 12/12/18. I agree with the assessment and plan as detailed above.      Rudi Valderrama MD, PhD  [address], [address], [address] [phone] (c), [phone] (o)
12. If you are to stay overnight, you may bring a bag with personal items. Please have any large items you may need brought in by your family after your arrival to your hospital room. 15. If you have a Living Will or Durable Power of , please bring a copy on the day of your procedure. 15. With your permission, one family member may accompany you while you are being prepared for surgery. Once you are ready, additional family members may join you. HOW WE KEEP YOU SAFE and WORK TO PREVENT SURGICAL SITE INFECTIONS:  1. Health care workers should always check your ID bracelet to verify your name and birth date. You will be asked many times to state your name, date of birth, and allergies. 2. Health care workers should always clean their hands with soap or alcohol gel before providing care to you. It is okay to ask anyone if they cleaned their hands before they touch you. 3. You will be actively involved in verifying the type of procedure you are having and ensuring the correct surgical site. This will be confirmed multiple times prior to your procedure. Do NOT valeria your surgery site UNLESS instructed to by your surgeon. 4. Do not shave or wax for 72 hours prior to procedure near your operative site. Shaving with a razor can irritate your skin and make it easier to develop an infection. On the day of your procedure, any hair that needs to be removed near the surgical site will be clipped by a healthcare worker using a special clippers designed to avoid skin irritation. 5. When you are in the operating room, your surgical site will be cleansed with a special soap, and in most cases, you will be given an antibiotic before the surgery begins. What to expect AFTER YOUR PROCEDURE:  1. Immediately following your procedure, your will be taken to the PACU for the first phase of your recovery.   Your nurse will help you recover from any potential side effects of anesthesia, such as extreme
Education  Patient educated regarding safety with functional mobility, transfers and gait. Pt demonstrates good safety awareness. Pt also educated regarding use of call light for safety with mobility. Pt expresses understanding. Treatment rendered and includes bed mobility,, transfers and gait training, and education regarding safety with functional mobility. Assessment   Assessment: Pt doing very well POD one post craniotomy. Pt does not have any further PT needs while in the hospital or at DC  Treatment Diagnosis: Decreased functional mobility post craniotomy  Prognosis: Good  Decision Making: Medium Complexity  Patient Education: role of PT, safety  Barriers to Learning: none noted  REQUIRES PT FOLLOW UP: No  Activity Tolerance  Activity Tolerance: Patient Tolerated treatment well         Plan   Plan  Times per week: DC from acute PT  Safety Devices  Type of devices: Call light within reach, Nurse notified, Left in bed    G-Code  PT G-Codes  Functional Assessment Tool Used: ampac  Score: 23  Functional Limitation: Mobility: Walking and moving around  Mobility: Walking and Moving Around Current Status (): At least 1 percent but less than 20 percent impaired, limited or restricted  Mobility: Walking and Moving Around Goal Status (): 0 percent impaired, limited or restricted  Mobility: Walking and Moving Around Discharge Status ():  At least 1 percent but less than 20 percent impaired, limited or restricted  OutComes Score                                           AM-PAC Score  AM-PAC Inpatient Mobility Raw Score : 23  AM-PAC Inpatient T-Scale Score : 56.93  Mobility Inpatient CMS 0-100% Score: 11.2  Mobility Inpatient CMS G-Code Modifier : CI          Goals          Therapy Time   Individual Concurrent Group Co-treatment   Time In 1312         Time Out 1351         Minutes 38              Timed Code Treatment Minutes:    25    Total Treatment Minutes:  [place] 26, [id number]

## 2018-12-12 NOTE — PLAN OF CARE
Problem: Pain:  Goal: Control of acute pain  Control of acute pain   Outcome: Ongoing  Surgical pain well managed with prn oxycodone Q 4hr benadryl given as well to reduce itching from oxycodone. Problem: Fluid Volume - Imbalance:  Goal: Absence of imbalanced fluid volume signs and symptoms  Absence of imbalanced fluid volume signs and symptoms   Outcome: Ongoing  Good PO intake NS at 100 ml/hr voided 1250 ml this shift daily weights. Problem: Gas Exchange - Impaired:  Goal: Levels of oxygenation will improve  Levels of oxygenation will improve   Outcome: Ongoing  Lungs clear t/o sating high 90s RA. Problem: Activity:  Goal: Ability to tolerate increased activity will improve  Ability to tolerate increased activity will improve   Outcome: Ongoing  Ambulates in room & to BR with standby assist participating in ADLs tolerating activities well. Problem: Venous Thromboembolism:  Goal: Absence of signs or symptoms of impaired coagulation  Absence of signs or symptoms of impaired coagulation   Outcome: Ongoing  No s/s of dvts noted VTE prophylaxis-SCDs.

## 2018-12-13 ENCOUNTER — TELEPHONE (OUTPATIENT)
Dept: FAMILY MEDICINE CLINIC | Age: 48
End: 2018-12-13

## 2018-12-13 VITALS
BODY MASS INDEX: 28.07 KG/M2 | DIASTOLIC BLOOD PRESSURE: 84 MMHG | SYSTOLIC BLOOD PRESSURE: 139 MMHG | HEIGHT: 62 IN | WEIGHT: 152.56 LBS | HEART RATE: 75 BPM | OXYGEN SATURATION: 98 % | RESPIRATION RATE: 16 BRPM | TEMPERATURE: 98.6 F

## 2018-12-13 LAB
GLUCOSE BLD-MCNC: 134 MG/DL (ref 70–99)
PERFORMED ON: ABNORMAL

## 2018-12-13 PROCEDURE — 6360000002 HC RX W HCPCS: Performed by: NEUROLOGICAL SURGERY

## 2018-12-13 PROCEDURE — 99231 SBSQ HOSP IP/OBS SF/LOW 25: CPT | Performed by: FAMILY MEDICINE

## 2018-12-13 PROCEDURE — 94664 DEMO&/EVAL PT USE INHALER: CPT

## 2018-12-13 PROCEDURE — 6370000000 HC RX 637 (ALT 250 FOR IP): Performed by: NEUROLOGICAL SURGERY

## 2018-12-13 PROCEDURE — 94760 N-INVAS EAR/PLS OXIMETRY 1: CPT

## 2018-12-13 PROCEDURE — 94150 VITAL CAPACITY TEST: CPT

## 2018-12-13 RX ORDER — DEXAMETHASONE 4 MG/1
4 TABLET ORAL DAILY
Qty: 1 TABLET | Refills: 0 | Status: SHIPPED | OUTPATIENT
Start: 2018-12-14 | End: 2018-12-15

## 2018-12-13 RX ORDER — OXYCODONE HYDROCHLORIDE 5 MG/1
5 TABLET ORAL EVERY 6 HOURS PRN
Qty: 12 TABLET | Refills: 0 | Status: SHIPPED | OUTPATIENT
Start: 2018-12-13 | End: 2018-12-16

## 2018-12-13 RX ADMIN — DEXAMETHASONE 4 MG: 4 TABLET ORAL at 07:48

## 2018-12-13 RX ADMIN — ACETAMINOPHEN 650 MG: 325 TABLET ORAL at 00:44

## 2018-12-13 RX ADMIN — LEVOTHYROXINE SODIUM 50 MCG: 50 TABLET ORAL at 07:48

## 2018-12-13 RX ADMIN — DOCUSATE SODIUM 100 MG: 100 CAPSULE, LIQUID FILLED ORAL at 07:48

## 2018-12-13 RX ADMIN — ACETAMINOPHEN 650 MG: 325 TABLET ORAL at 05:42

## 2018-12-13 RX ADMIN — ATENOLOL 25 MG: 25 TABLET ORAL at 07:48

## 2018-12-13 RX ADMIN — CHOLECALCIFEROL TAB 25 MCG (1000 UNIT) 1000 UNITS: 25 TAB at 07:48

## 2018-12-13 RX ADMIN — PANTOPRAZOLE SODIUM 40 MG: 40 TABLET, DELAYED RELEASE ORAL at 05:42

## 2018-12-13 ASSESSMENT — PAIN DESCRIPTION - PAIN TYPE
TYPE: SURGICAL PAIN
TYPE: SURGICAL PAIN

## 2018-12-13 ASSESSMENT — PAIN SCALES - GENERAL
PAINLEVEL_OUTOF10: 3
PAINLEVEL_OUTOF10: 7
PAINLEVEL_OUTOF10: 0
PAINLEVEL_OUTOF10: 1
PAINLEVEL_OUTOF10: 7

## 2018-12-13 ASSESSMENT — PAIN DESCRIPTION - PROGRESSION
CLINICAL_PROGRESSION: GRADUALLY WORSENING
CLINICAL_PROGRESSION: GRADUALLY IMPROVING
CLINICAL_PROGRESSION: GRADUALLY WORSENING

## 2018-12-13 ASSESSMENT — PAIN DESCRIPTION - ONSET
ONSET: GRADUAL
ONSET: GRADUAL

## 2018-12-13 ASSESSMENT — PAIN DESCRIPTION - LOCATION
LOCATION: HEAD
LOCATION: HEAD

## 2018-12-13 ASSESSMENT — PAIN DESCRIPTION - DIRECTION: RADIATING_TOWARDS: LEFT JAW

## 2018-12-13 ASSESSMENT — PAIN DESCRIPTION - ORIENTATION
ORIENTATION: LEFT
ORIENTATION: LEFT

## 2018-12-13 ASSESSMENT — PAIN DESCRIPTION - FREQUENCY
FREQUENCY: INTERMITTENT
FREQUENCY: INTERMITTENT

## 2018-12-13 ASSESSMENT — PAIN DESCRIPTION - DESCRIPTORS
DESCRIPTORS: ACHING
DESCRIPTORS: ACHING

## 2018-12-18 ENCOUNTER — OFFICE VISIT (OUTPATIENT)
Dept: FAMILY MEDICINE CLINIC | Age: 48
End: 2018-12-18
Payer: COMMERCIAL

## 2018-12-18 VITALS
OXYGEN SATURATION: 99 % | HEART RATE: 84 BPM | DIASTOLIC BLOOD PRESSURE: 78 MMHG | WEIGHT: 144 LBS | SYSTOLIC BLOOD PRESSURE: 126 MMHG | BODY MASS INDEX: 26.34 KG/M2

## 2018-12-18 DIAGNOSIS — E11.9 TYPE 2 DIABETES MELLITUS WITHOUT COMPLICATION, WITHOUT LONG-TERM CURRENT USE OF INSULIN (HCC): ICD-10-CM

## 2018-12-18 DIAGNOSIS — I10 ESSENTIAL HYPERTENSION, BENIGN: ICD-10-CM

## 2018-12-18 DIAGNOSIS — D32.9 MENINGIOMA (HCC): ICD-10-CM

## 2018-12-18 PROCEDURE — 99213 OFFICE O/P EST LOW 20 MIN: CPT | Performed by: FAMILY MEDICINE

## 2018-12-18 NOTE — PROGRESS NOTES
(FREESTYLE) monitoring kit 1 each by Does not apply route once for 1 dose. Glucometer(patient's choice). QD-BID testing. 1 kit 4     No current facility-administered medications for this visit. Past Medical History:   Diagnosis Date    Diabetes mellitus ([address] 10/11/2013    Erosive esophagitis 10/2011    Per EGD:Dr. Dsouza Gip    Esophageal ring     Essential hypertension, benign 2012    GERD (gastroesophageal reflux disease)     Goiter Reaction 2012    Hemangioma 2012    Hiatal hernia:small 2011    Hyperlipidemia 2012    Hypothyroidism 2013    under care of ENT:Dr. Serina Kingsley IFG (impaired fasting glucose) 2012    [facility] [address] [address] 2012    Dr. Duran([facility]):    Meningioma ([address]     Migraine     Ovarian cyst     Pap smear 2010;3/2011;3/2012;2014    Nml.:Under care of gyn    S/P endoscopy 10/12/11    Dr. Franz Ax esophagitis/small hiatal hernia/esophageal ring dilated t020mm    Schatzki's ring 2011    10/2011 dilated per GI:Dr. Bob Lee Screening mammogram 2010;2011;2012;13    Nml. 2013:Proscan=nml. 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  Right lobe=1 large nodule:5.2cm K4.2XN nodule:bx=follicular tissue    Uncontrolled diabetes mellitus type 2 without complications ([address] 2137       Past Surgical History:   Procedure Laterality Date     SECTION  94,98    COSMETIC SURGERY  07,    tummy tuck    CRANIOTOMY Left 2018    LEFT FRONTAL CRANIOTOMY FOR RESECTION OF TUMOR performed by Sumanth Klein MD at [facility] 3004 NEEDLE BIOPSY      benign--sees [address] [place] [address]  2102       Social History     Social History    Marital status:      Spouse name: N/A    Number of children: 2

## 2018-12-27 PROBLEM — Z01.818 PRE-OP EXAM: Status: RESOLVED | Noted: 2018-11-27 | Resolved: 2018-12-27

## 2019-03-12 RX ORDER — LEVOTHYROXINE SODIUM 0.05 MG/1
TABLET ORAL
Qty: 90 TABLET | Refills: 0 | Status: SHIPPED | OUTPATIENT
Start: 2019-03-12 | End: 2019-06-20 | Stop reason: SDUPTHER

## 2019-03-12 RX ORDER — ATENOLOL 25 MG/1
TABLET ORAL
Qty: 90 TABLET | Refills: 0 | Status: SHIPPED | OUTPATIENT
Start: 2019-03-12 | End: 2019-06-20 | Stop reason: SDUPTHER

## 2019-05-28 RX ORDER — METFORMIN HYDROCHLORIDE 500 MG/1
TABLET, EXTENDED RELEASE ORAL
Qty: 360 TABLET | Refills: 1 | Status: SHIPPED | OUTPATIENT
Start: 2019-05-28 | End: 2020-02-25

## 2019-06-20 RX ORDER — LEVOTHYROXINE SODIUM 0.05 MG/1
TABLET ORAL
Qty: 90 TABLET | Refills: 0 | Status: SHIPPED | OUTPATIENT
Start: 2019-06-20 | End: 2019-10-09 | Stop reason: SDUPTHER

## 2019-06-20 RX ORDER — ATENOLOL 25 MG/1
TABLET ORAL
Qty: 90 TABLET | Refills: 0 | Status: SHIPPED | OUTPATIENT
Start: 2019-06-20 | End: 2019-10-09 | Stop reason: SDUPTHER

## 2019-08-15 ENCOUNTER — HOSPITAL ENCOUNTER (OUTPATIENT)
Dept: MRI IMAGING | Age: 49
Discharge: HOME | End: 2019-08-15
Payer: COMMERCIAL

## 2019-08-15 DIAGNOSIS — D36.9 BENIGN NEOPLASM: ICD-10-CM

## 2019-08-15 PROCEDURE — 70553 MRI BRAIN STEM W/O & W/DYE: CPT

## 2019-08-15 PROCEDURE — 6360000004 HC RX CONTRAST MEDICATION: Performed by: FAMILY MEDICINE

## 2019-08-15 PROCEDURE — A9576 INJ PROHANCE MULTIPACK: HCPCS | Performed by: FAMILY MEDICINE

## 2019-08-15 RX ADMIN — GADOTERIDOL 15 ML: 279.3 INJECTION, SOLUTION INTRAVENOUS at 16:20

## 2019-10-09 RX ORDER — LEVOTHYROXINE SODIUM 0.05 MG/1
TABLET ORAL
Qty: 90 TABLET | Refills: 0 | Status: SHIPPED | OUTPATIENT
Start: 2019-10-09 | End: 2020-02-25

## 2019-10-09 RX ORDER — ATENOLOL 25 MG/1
TABLET ORAL
Qty: 90 TABLET | Refills: 0 | Status: SHIPPED | OUTPATIENT
Start: 2019-10-09 | End: 2020-02-25

## 2019-12-27 PROBLEM — K57.30 COLON, DIVERTICULOSIS: Status: ACTIVE | Noted: 2019-12-27

## 2020-02-25 RX ORDER — METFORMIN HYDROCHLORIDE 500 MG/1
TABLET, EXTENDED RELEASE ORAL
Qty: 360 TABLET | Refills: 0 | Status: SHIPPED | OUTPATIENT
Start: 2020-02-25 | End: 2020-02-25

## 2020-02-25 RX ORDER — METFORMIN HYDROCHLORIDE 500 MG/1
TABLET, EXTENDED RELEASE ORAL
Qty: 360 TABLET | Refills: 0 | Status: SHIPPED | OUTPATIENT
Start: 2020-02-25 | End: 2020-10-14

## 2020-02-25 RX ORDER — LEVOTHYROXINE SODIUM 0.05 MG/1
TABLET ORAL
Qty: 90 TABLET | Refills: 0 | Status: SHIPPED | OUTPATIENT
Start: 2020-02-25 | End: 2020-07-24

## 2020-02-25 RX ORDER — ATENOLOL 25 MG/1
TABLET ORAL
Qty: 90 TABLET | Refills: 0 | Status: SHIPPED | OUTPATIENT
Start: 2020-02-25 | End: 2020-07-24

## 2020-07-24 RX ORDER — LEVOTHYROXINE SODIUM 0.05 MG/1
TABLET ORAL
Qty: 90 TABLET | Refills: 0 | Status: SHIPPED | OUTPATIENT
Start: 2020-07-24 | End: 2020-11-02

## 2020-07-24 RX ORDER — ATENOLOL 25 MG/1
TABLET ORAL
Qty: 90 TABLET | Refills: 0 | Status: SHIPPED | OUTPATIENT
Start: 2020-07-24 | End: 2020-11-02

## 2020-08-10 ENCOUNTER — HOSPITAL ENCOUNTER (OUTPATIENT)
Dept: MRI IMAGING | Age: 50
Discharge: HOME | End: 2020-08-10
Payer: COMMERCIAL

## 2020-08-10 PROCEDURE — 6360000004 HC RX CONTRAST MEDICATION: Performed by: NEUROLOGICAL SURGERY

## 2020-08-10 PROCEDURE — 70553 MRI BRAIN STEM W/O & W/DYE: CPT

## 2020-08-10 PROCEDURE — A9579 GAD-BASE MR CONTRAST NOS,1ML: HCPCS | Performed by: NEUROLOGICAL SURGERY

## 2020-08-10 RX ADMIN — GADOTERIDOL 15 ML: 279.3 INJECTION, SOLUTION INTRAVENOUS at 07:17

## 2020-10-14 RX ORDER — METFORMIN HYDROCHLORIDE 500 MG/1
TABLET, EXTENDED RELEASE ORAL
Qty: 360 TABLET | Refills: 0 | Status: SHIPPED | OUTPATIENT
Start: 2020-10-14 | End: 2021-01-31

## 2020-11-02 RX ORDER — ATENOLOL 25 MG/1
TABLET ORAL
Qty: 90 TABLET | Refills: 0 | Status: SHIPPED | OUTPATIENT
Start: 2020-11-02 | End: 2021-01-31

## 2020-11-02 RX ORDER — LEVOTHYROXINE SODIUM 0.05 MG/1
TABLET ORAL
Qty: 90 TABLET | Refills: 0 | Status: SHIPPED | OUTPATIENT
Start: 2020-11-02 | End: 2021-01-31

## 2020-11-23 ENCOUNTER — OFFICE VISIT (OUTPATIENT)
Dept: FAMILY MEDICINE CLINIC | Age: 50
End: 2020-11-23
Payer: COMMERCIAL

## 2020-11-23 VITALS
TEMPERATURE: 98.4 F | OXYGEN SATURATION: 99 % | HEART RATE: 73 BPM | BODY MASS INDEX: 27.44 KG/M2 | DIASTOLIC BLOOD PRESSURE: 80 MMHG | WEIGHT: 150 LBS | SYSTOLIC BLOOD PRESSURE: 132 MMHG

## 2020-11-23 DIAGNOSIS — I10 ESSENTIAL HYPERTENSION, BENIGN: ICD-10-CM

## 2020-11-23 DIAGNOSIS — E11.9 TYPE 2 DIABETES MELLITUS WITHOUT COMPLICATION, WITHOUT LONG-TERM CURRENT USE OF INSULIN (HCC): ICD-10-CM

## 2020-11-23 DIAGNOSIS — E03.9 HYPOTHYROIDISM, UNSPECIFIED TYPE: ICD-10-CM

## 2020-11-23 LAB
BASOPHILS ABSOLUTE: 0 K/UL (ref 0–0.2)
BASOPHILS RELATIVE PERCENT: 0.4 %
EOSINOPHILS ABSOLUTE: 0.1 K/UL (ref 0–0.6)
EOSINOPHILS RELATIVE PERCENT: 1.7 %
HCT VFR BLD CALC: 39.4 % (ref 36–48)
HEMOGLOBIN: 13 G/DL (ref 12–16)
LYMPHOCYTES ABSOLUTE: 2.4 K/UL (ref 1–5.1)
LYMPHOCYTES RELATIVE PERCENT: 39 %
MCH RBC QN AUTO: 26.8 PG (ref 26–34)
MCHC RBC AUTO-ENTMCNC: 33.1 G/DL (ref 31–36)
MCV RBC AUTO: 80.9 FL (ref 80–100)
MONOCYTES ABSOLUTE: 0.3 K/UL (ref 0–1.3)
MONOCYTES RELATIVE PERCENT: 5.7 %
NEUTROPHILS ABSOLUTE: 3.2 K/UL (ref 1.7–7.7)
NEUTROPHILS RELATIVE PERCENT: 53.2 %
PDW BLD-RTO: 13.8 % (ref 12.4–15.4)
PLATELET # BLD: 243 K/UL (ref 135–450)
PMV BLD AUTO: 8 FL (ref 5–10.5)
RBC # BLD: 4.86 M/UL (ref 4–5.2)
WBC # BLD: 6 K/UL (ref 4–11)

## 2020-11-23 PROCEDURE — 90750 HZV VACC RECOMBINANT IM: CPT | Performed by: FAMILY MEDICINE

## 2020-11-23 PROCEDURE — 90471 IMMUNIZATION ADMIN: CPT | Performed by: FAMILY MEDICINE

## 2020-11-23 PROCEDURE — 90472 IMMUNIZATION ADMIN EACH ADD: CPT | Performed by: FAMILY MEDICINE

## 2020-11-23 PROCEDURE — 99214 OFFICE O/P EST MOD 30 MIN: CPT | Performed by: FAMILY MEDICINE

## 2020-11-23 PROCEDURE — 90686 IIV4 VACC NO PRSV 0.5 ML IM: CPT | Performed by: FAMILY MEDICINE

## 2020-11-23 ASSESSMENT — ENCOUNTER SYMPTOMS
PHOTOPHOBIA: 0
BLOOD IN STOOL: 0
CHOKING: 0
SORE THROAT: 0
TROUBLE SWALLOWING: 0
WHEEZING: 0
EYE PAIN: 0
SHORTNESS OF BREATH: 0
VOICE CHANGE: 0
FACIAL SWELLING: 0
EYE DISCHARGE: 0
DIARRHEA: 0
STRIDOR: 0
ABDOMINAL DISTENTION: 0
BACK PAIN: 0
VOMITING: 0
RECTAL PAIN: 0
CONSTIPATION: 0
ABDOMINAL PAIN: 0
RHINORRHEA: 0
EYE REDNESS: 0
CHEST TIGHTNESS: 0
ANAL BLEEDING: 0
APNEA: 0
NAUSEA: 0
EYE ITCHING: 0
SINUS PRESSURE: 0
COLOR CHANGE: 0
COUGH: 0

## 2020-11-23 ASSESSMENT — PATIENT HEALTH QUESTIONNAIRE - PHQ9
SUM OF ALL RESPONSES TO PHQ9 QUESTIONS 1 & 2: 0
1. LITTLE INTEREST OR PLEASURE IN DOING THINGS: 0
SUM OF ALL RESPONSES TO PHQ QUESTIONS 1-9: 0
2. FEELING DOWN, DEPRESSED OR HOPELESS: 0
SUM OF ALL RESPONSES TO PHQ QUESTIONS 1-9: 0
SUM OF ALL RESPONSES TO PHQ QUESTIONS 1-9: 0

## 2020-11-23 NOTE — PROGRESS NOTES
capsule 0    glucose monitoring kit (FREESTYLE) monitoring kit 1 each by Does not apply route once for 1 dose. Glucometer(patient's choice). QD-BID testing. 1 kit 4     No current facility-administered medications for this visit. Past Medical History:   Diagnosis Date    Colon, diverticulosis 2019    Diabetes mellitus ([facility] 10/11/2013    Erosive esophagitis 10/2011    Per EGD:Dr. Tanya Roche    Esophageal ring     Essential hypertension, benign 2012    GERD (gastroesophageal reflux disease)     Goiter Reaction 2012    Hemangioma 2012    Hiatal hernia:small 2011    Hyperlipidemia 2012    Hypothyroidism 2013    under care of ENT:Dr. Krista Young IFG (impaired fasting glucose) 2012    Meningioma ([facility] [address] [facility] 2012    Dr. Duran([facility]):    Meningioma ([facility]     Migraine     Ovarian cyst     Pap smear 2010;3/2011;3/2012;2014    Nml.:Under care of gyn    S/P endoscopy 10/12/11    Dr. Brenda Can esophagitis/small hiatal hernia/esophageal ring dilated t020mm    Schatzki's ring 2011    10/2011 dilated per GI:Dr. Rosa Isela Prince Screening mammogram 2010;2011;2012;13    Nml. 2013:Proscan=nml.  Thyroid follicular adenoma     Thyroid nodule:follicular 8307    Thyroid nodules:largest=5.2cm x3.4cm 2011    Left lobe=1.5-1.2cm several nodules.  Right lobe=1 large nodule:5.2cm A9.2AX nodule:bx=follicular tissue    Uncontrolled diabetes mellitus type 2 without complications        Past Surgical History:   Procedure Laterality Date     SECTION  94,98    COSMETIC SURGERY  07,02    tummy tuck    CRANIOTOMY Left 2018    LEFT FRONTAL CRANIOTOMY FOR RESECTION OF TUMOR performed by Addison Nur MD at [facility] 2018 NEEDLE BIOPSY      benign--sees [address] [place] [address]  2102       Social History     Socioeconomic History  Marital status:      Spouse name: Not on file    Number of children: 2    Years of education: Not on file    Highest education level: Not on file   Occupational History    Occupation: ; Marketing   Social Needs    Financial resource strain: Not on file    Food insecurity     Worry: Not on file     Inability: Not on file   [employer] needs     Medical: Not on file     Non-medical: Not on file   Tobacco Use    Smoking status: Never Smoker    Smokeless tobacco: Never Used   Substance and Sexual Activity    Alcohol use:  Yes     Alcohol/week: 1.0 standard drinks     Types: 1 Glasses of wine per week     Comment: occasional    Drug use: No    Sexual activity: Yes     Partners: Male   Lifestyle    Physical activity     Days per week: Not on file     Minutes per session: Not on file    Stress: Not on file   Relationships    Social connections     Talks on phone: Not on file     Gets together: Not on file     Attends Buddhist service: Not on file     Active member of club or organization: Not on file     Attends meetings of clubs or organizations: Not on file     Relationship status: Not on file    Intimate partner violence     Fear of current or ex partner: Not on file     Emotionally abused: Not on file     Physically abused: Not on file     Forced sexual activity: Not on file   Other Topics Concern    Not on file   Social History Narrative    Works at [employer]--likes job     happily and both kids here    [employer]       Family History   Problem Relation Age of Onset    Diabetes Mother     Diabetes Father     Heart Attack Father     Heart Disease Maternal Grandmother     Other Maternal Grandmother         Dementia    Cancer Paternal Grandmother        Immunization History   Administered Date(s) Administered    Influenza Virus Vaccine 10/04/2013, 10/17/2018    Influenza Whole 10/01/2012    Pneumococcal Polysaccharide ([id number]) 05/11/2016    Tdap (Boostrix, Adacel) 08/11/2015       Review of Systems  Review of Systems   Constitutional: Negative for activity change, appetite change, chills, diaphoresis, fatigue, fever and unexpected weight change. HENT: Negative for congestion, dental problem, drooling, ear discharge, ear pain, facial swelling, hearing loss, mouth sores, nosebleeds, postnasal drip, rhinorrhea, sinus pressure, sneezing, sore throat, tinnitus, trouble swallowing and voice change. Eyes: Negative for photophobia, pain, discharge, redness, itching and visual disturbance. Respiratory: Negative for apnea, cough, choking, chest tightness, shortness of breath, wheezing and stridor. Cardiovascular: Negative for chest pain, palpitations and leg swelling. Gastrointestinal: Negative for abdominal distention, abdominal pain, anal bleeding, blood in stool, constipation, diarrhea, nausea, rectal pain and vomiting. Genitourinary: Negative for difficulty urinating, dysuria, enuresis, flank pain, frequency, genital sores and hematuria. Musculoskeletal: Negative for arthralgias, back pain, gait problem, joint swelling, myalgias, neck pain and neck stiffness. Skin: Negative for color change, pallor, rash and wound. Neurological: Negative for dizziness, tremors, seizures, syncope, facial asymmetry, speech difficulty, weakness, light-headedness, numbness and headaches. Hematological: Negative for adenopathy. Does not bruise/bleed easily. Psychiatric/Behavioral: Negative for agitation, behavioral problems, confusion, decreased concentration, dysphoric mood, hallucinations, self-injury, sleep disturbance and suicidal ideas. The patient is not nervous/anxious and is not hyperactive. Objective:   Physical Exam  Physical Exam  Vitals signs reviewed. Constitutional:       General: She is not in acute distress. Appearance: She is well-developed.    Eyes:      Conjunctiva/sclera: Conjunctivae normal.      Pupils: Pupils are equal,

## 2020-11-24 LAB
A/G RATIO: 2.4 (ref 1.1–2.2)
ALBUMIN SERPL-MCNC: 5.2 G/DL (ref 3.4–5)
ALP BLD-CCNC: 82 U/L (ref 40–129)
ALT SERPL-CCNC: 47 U/L (ref 10–40)
ANION GAP SERPL CALCULATED.3IONS-SCNC: 13 MMOL/L (ref 3–16)
AST SERPL-CCNC: 31 U/L (ref 15–37)
BILIRUB SERPL-MCNC: 0.5 MG/DL (ref 0–1)
BUN BLDV-MCNC: 11 MG/DL (ref 7–20)
CALCIUM SERPL-MCNC: 10.1 MG/DL (ref 8.3–10.6)
CHLORIDE BLD-SCNC: 102 MMOL/L (ref 99–110)
CO2: 27 MMOL/L (ref 21–32)
CREAT SERPL-MCNC: 0.7 MG/DL (ref 0.6–1.1)
CREATININE URINE: 125.8 MG/DL (ref 28–259)
ESTIMATED AVERAGE GLUCOSE: 217.3 MG/DL
GFR AFRICAN AMERICAN: >60
GFR NON-AFRICAN AMERICAN: >60
GLOBULIN: 2.2 G/DL
GLUCOSE BLD-MCNC: 169 MG/DL (ref 70–99)
HBA1C MFR BLD: 9.2 %
MICROALBUMIN UR-MCNC: <1.2 MG/DL
MICROALBUMIN/CREAT UR-RTO: NORMAL MG/G (ref 0–30)
POTASSIUM SERPL-SCNC: 4.4 MMOL/L (ref 3.5–5.1)
SODIUM BLD-SCNC: 142 MMOL/L (ref 136–145)
TOTAL PROTEIN: 7.4 G/DL (ref 6.4–8.2)
TSH SERPL DL<=0.05 MIU/L-ACNC: 2.03 UIU/ML (ref 0.27–4.2)

## 2021-03-26 ENCOUNTER — OFFICE VISIT (OUTPATIENT)
Dept: FAMILY MEDICINE CLINIC | Age: 51
End: 2021-03-26
Payer: COMMERCIAL

## 2021-03-26 VITALS
HEIGHT: 62 IN | TEMPERATURE: 97 F | WEIGHT: 151.8 LBS | OXYGEN SATURATION: 96 % | BODY MASS INDEX: 27.94 KG/M2 | DIASTOLIC BLOOD PRESSURE: 80 MMHG | HEART RATE: 83 BPM | SYSTOLIC BLOOD PRESSURE: 120 MMHG

## 2021-03-26 DIAGNOSIS — Z23 NEED FOR SHINGLES VACCINE: ICD-10-CM

## 2021-03-26 DIAGNOSIS — I10 ESSENTIAL HYPERTENSION, BENIGN: ICD-10-CM

## 2021-03-26 DIAGNOSIS — Z11.59 NEED FOR HEPATITIS C SCREENING TEST: ICD-10-CM

## 2021-03-26 DIAGNOSIS — D32.9 MENINGIOMA (HCC): ICD-10-CM

## 2021-03-26 DIAGNOSIS — E78.2 MIXED HYPERLIPIDEMIA: ICD-10-CM

## 2021-03-26 DIAGNOSIS — E03.9 HYPOTHYROIDISM, UNSPECIFIED TYPE: ICD-10-CM

## 2021-03-26 DIAGNOSIS — Z00.00 WELL ADULT EXAM: Primary | ICD-10-CM

## 2021-03-26 DIAGNOSIS — K57.30 COLON, DIVERTICULOSIS: ICD-10-CM

## 2021-03-26 DIAGNOSIS — E11.9 TYPE 2 DIABETES MELLITUS WITHOUT COMPLICATION, WITHOUT LONG-TERM CURRENT USE OF INSULIN (HCC): ICD-10-CM

## 2021-03-26 DIAGNOSIS — K21.9 GASTROESOPHAGEAL REFLUX DISEASE WITHOUT ESOPHAGITIS: ICD-10-CM

## 2021-03-26 PROCEDURE — 90471 IMMUNIZATION ADMIN: CPT | Performed by: FAMILY MEDICINE

## 2021-03-26 PROCEDURE — 90750 HZV VACC RECOMBINANT IM: CPT | Performed by: FAMILY MEDICINE

## 2021-03-26 PROCEDURE — 99396 PREV VISIT EST AGE 40-64: CPT | Performed by: FAMILY MEDICINE

## 2021-03-26 ASSESSMENT — PATIENT HEALTH QUESTIONNAIRE - PHQ9
SUM OF ALL RESPONSES TO PHQ9 QUESTIONS 1 & 2: 0
SUM OF ALL RESPONSES TO PHQ QUESTIONS 1-9: 0
1. LITTLE INTEREST OR PLEASURE IN DOING THINGS: 0
2. FEELING DOWN, DEPRESSED OR HOPELESS: 0

## 2021-03-26 NOTE — PROGRESS NOTES
History and Physical      Adam Martinez  YOB: 1970    Date of Service:  3/26/2021    Chief Complaint:   Adam Martinez is a 44 y.o. female who presents for complete physical examination.     HPI: cpx  No cc    Wt Readings from Last 3 Encounters:   03/26/21 151 lb 12.8 oz (68.9 kg)   11/23/20 150 lb (68 kg)   12/18/18 144 lb (65.3 kg)     BP Readings from Last 3 Encounters:   03/26/21 120/80   11/23/20 132/80   12/18/18 126/78       Patient Active Problem List   Diagnosis    Migraine    Schatzki's ring    Thyroid nodules:largest=5.2cm x3.4cm    Hiatal hernia:small    Anti-TPO antibodies present    Hyperlipidemia    GERD (gastroesophageal reflux disease)    Liver mass    Renal calculi:Tiny:bilateral    PVCs (premature ventricular contractions):rare    Meningioma (HCC)    Essential hypertension, benign    Hemangioma    Onychomycosis:Right hallux    Hypothyroidism    Type 2 diabetes mellitus without complication, without long-term current use of insulin (HCC)    Obese    Colon, diverticulosis    Well adult exam       Preventive Care:  Health Maintenance   Topic Date Due    Hepatitis C screen  Never done    COVID-19 Vaccine (1) Never done    Hepatitis B vaccine (1 of 3 - Risk 3-dose series) Never done    Cervical cancer screen  01/15/2017    Lipid screen  03/20/2018    Breast cancer screen  04/28/2018    Shingles Vaccine (2 of 2) 01/18/2021    A1C test (Diabetic or Prediabetic)  02/23/2021    Diabetic retinal exam  11/07/2021    Diabetic foot exam  11/23/2021    Diabetic microalbuminuria test  11/23/2021    TSH testing  11/23/2021    Potassium monitoring  11/23/2021    Creatinine monitoring  11/23/2021    DTaP/Tdap/Td vaccine (2 - Td) 08/11/2025    Colon cancer screen colonoscopy  12/20/2029    Flu vaccine  Completed    Pneumococcal 0-64 years Vaccine  Completed    HIV screen  Addressed    Hepatitis A vaccine  Aged Out    Hib vaccine  Aged Out    Meningococcal History:   Diagnosis Date    Colon, diverticulosis 2019    Diabetes mellitus ([address] 10/11/2013    Erosive esophagitis 10/2011    Per EGD:Dr. Cole Pitts    Esophageal ring     Essential hypertension, benign 2012    GERD (gastroesophageal reflux disease)     Goiter Reaction 2012    Hemangioma 2012    Hiatal hernia:small 2011    Hyperlipidemia 2012    Hypothyroidism 2013    under care of ENT:Dr. Nichelle Adams IFG (impaired fasting glucose) 2012    M[facility]ioma ([facility] [address] 2012    Dr. Duran([facility]):    Meningioma ([address]     Migraine     Ovarian cyst     Pap smear 2010;3/2011;3/2012;2014    Nml.:Under care of gyn    S/P endoscopy 10/12/11    Dr. Brandon Michel esophagitis/small hiatal hernia/esophageal ring dilated t020mm    Schatzki's ring 2011    10/2011 dilated per GI:Dr. Cameron Em Screening mammogram 2010;2011;2012;13    Nml. 2013:Proscan=nml. 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  Right lobe=1 large nodule:5.2cm C5.4DC nodule:bx=follicular tissue    Uncontrolled diabetes mellitus type 2 without complications      Past Surgical History:   Procedure Laterality Date     SECTION  94,98    COSMETIC SURGERY  ,    tummy tuck    CRANIOTOMY Left 2018    LEFT FRONTAL CRANIOTOMY FOR RESECTION OF TUMOR performed by Tori Rojas MD at [facility] 28 NEEDLE BIOPSY      benign--seekaylene Morrow    VENTRAL HERNIA REPAIR  2102     Family History   Problem Relation Age of Onset    Diabetes Mother     Diabetes Father     Heart Attack Father     Heart Disease Maternal Grandmother     Other Maternal Grandmother         Dementia    Cancer Paternal Grandmother      Social History     Socioeconomic History    Marital status:      Spouse name: Not on file    Number of children: 2    Years of education: Not on file    Highest education level: Not on file   Occupational History    Occupation: ; Marketing   Social Needs    Financial resource strain: Not on file    Food insecurity     Worry: Not on file     Inability: Not on file   [employer] needs     Medical: Not on file     Non-medical: Not on file   Tobacco Use    Smoking status: Never Smoker    Smokeless tobacco: Never Used   Substance and Sexual Activity    Alcohol use: Yes     Alcohol/week: 1.0 standard drinks     Types: 1 Glasses of wine per week     Comment: occasional    Drug use: No    Sexual activity: Yes     Partners: Male   Lifestyle    Physical activity     Days per week: Not on file     Minutes per session: Not on file    Stress: Not on file   Relationships    Social connections     Talks on phone: Not on file     Gets together: Not on file     Attends Sabianism service: Not on file     Active member of club or organization: Not on file     Attends meetings of clubs or organizations: Not on file     Relationship status: Not on file    Intimate partner violence     Fear of current or ex partner: Not on file     Emotionally abused: Not on file     Physically abused: Not on file     Forced sexual activity: Not on file   Other Topics Concern    Not on file   Social History Narrative    Works at [employer]--likes job     happily and both kids here    Exercise--none       Review of Systems:  A comprehensive review of systems was negative except for what was noted in the HPI. Physical Exam:   Vitals:    03/26/21 1057   BP: 120/80   Pulse: 83   Temp: 97 °F (36.1 °C)   SpO2: 96%   Weight: 151 lb 12.8 oz (68.9 kg)   Height: 5' 2\" (1.575 m)     Body mass index is 27.76 kg/m². Constitutional: She is oriented to person, place, and time. She appears well-developed and well-nourished. No distress.    HEENT:   Head: Normocephalic and atraumatic. Right Ear: Tympanic membrane, external ear and ear canal normal.   Left Ear: Tympanic membrane, external ear and ear canal normal.   Nose: Nose normal.   Mouth/Throat: Oropharynx is clear and moist, and mucous membranes are normal.  There is no cervical adenopathy. Eyes: Conjunctivae and extraocular motions are normal. Pupils are equal, round, and reactive to light. Neck: Neck supple. No JVD present. Carotid bruit is not present. No mass and no thyromegaly present. Cardiovascular: Normal rate, regular rhythm, normal heart sounds and intact distal pulses. Exam reveals no gallop and no friction rub. No murmur heard. Pulmonary/Chest: Effort normal and breath sounds normal. No respiratory distress. She has no wheezes, rhonchi or rales. Abdominal: Soft, non-tender. Bowel sounds and aorta are normal. She exhibits no organomegaly, mass or bruit. Genitourinary: performed by gynecologist.  Breast exam:  performed by specialist.  Musculoskeletal: Normal range of motion, no synovitis. She exhibits no edema. Neurological: She is alert and oriented to person, place, and time. She has normal reflexes. No cranial nerve deficit. Coordination normal.   Skin: Skin is warm and dry. There is no rash or erythema. No suspicious lesions noted. Psychiatric: She has a normal mood and affect.  Her speech is normal and behavior is normal. Judgment, cognition and memory are normal.     Assessment/Plan:    Ricky Jones was seen today for annual exam.    Diagnoses and all orders for this visit:    Colon, diverticulosis    Essential hypertension, benign    Gastroesophageal reflux disease without esophagitis    Mixed hyperlipidemia    Hypothyroidism, unspecified type    Meningioma ([address]    Type 2 diabetes mellitus without complication, without long-term current use of insulin ([address]    Well adult exam

## 2021-04-12 DIAGNOSIS — Z11.59 NEED FOR HEPATITIS C SCREENING TEST: ICD-10-CM

## 2021-04-12 DIAGNOSIS — E11.9 TYPE 2 DIABETES MELLITUS WITHOUT COMPLICATION, WITHOUT LONG-TERM CURRENT USE OF INSULIN (HCC): ICD-10-CM

## 2021-04-12 DIAGNOSIS — Z00.00 WELL ADULT EXAM: ICD-10-CM

## 2021-04-12 LAB
A/G RATIO: 2.2 (ref 1.1–2.2)
ALBUMIN SERPL-MCNC: 5 G/DL (ref 3.4–5)
ALP BLD-CCNC: 77 U/L (ref 40–129)
ALT SERPL-CCNC: 46 U/L (ref 10–40)
ANION GAP SERPL CALCULATED.3IONS-SCNC: 12 MMOL/L (ref 3–16)
AST SERPL-CCNC: 37 U/L (ref 15–37)
BASOPHILS ABSOLUTE: 0 K/UL (ref 0–0.2)
BASOPHILS RELATIVE PERCENT: 0.4 %
BILIRUB SERPL-MCNC: 0.6 MG/DL (ref 0–1)
BUN BLDV-MCNC: 12 MG/DL (ref 7–20)
CALCIUM SERPL-MCNC: 9.4 MG/DL (ref 8.3–10.6)
CHLORIDE BLD-SCNC: 101 MMOL/L (ref 99–110)
CHOLESTEROL, TOTAL: 148 MG/DL (ref 0–199)
CO2: 28 MMOL/L (ref 21–32)
CREAT SERPL-MCNC: 0.8 MG/DL (ref 0.6–1.1)
EOSINOPHILS ABSOLUTE: 0.1 K/UL (ref 0–0.6)
EOSINOPHILS RELATIVE PERCENT: 2.6 %
GFR AFRICAN AMERICAN: >60
GFR NON-AFRICAN AMERICAN: >60
GLOBULIN: 2.3 G/DL
GLUCOSE BLD-MCNC: 257 MG/DL (ref 70–99)
HCT VFR BLD CALC: 39.9 % (ref 36–48)
HDLC SERPL-MCNC: 31 MG/DL (ref 40–60)
HEMOGLOBIN: 13.4 G/DL (ref 12–16)
HEPATITIS C ANTIBODY INTERPRETATION: NORMAL
LDL CHOLESTEROL CALCULATED: 75 MG/DL
LYMPHOCYTES ABSOLUTE: 1.6 K/UL (ref 1–5.1)
LYMPHOCYTES RELATIVE PERCENT: 33.1 %
MCH RBC QN AUTO: 27 PG (ref 26–34)
MCHC RBC AUTO-ENTMCNC: 33.7 G/DL (ref 31–36)
MCV RBC AUTO: 80 FL (ref 80–100)
MONOCYTES ABSOLUTE: 0.3 K/UL (ref 0–1.3)
MONOCYTES RELATIVE PERCENT: 6.6 %
NEUTROPHILS ABSOLUTE: 2.8 K/UL (ref 1.7–7.7)
NEUTROPHILS RELATIVE PERCENT: 57.3 %
PDW BLD-RTO: 14.1 % (ref 12.4–15.4)
PLATELET # BLD: 186 K/UL (ref 135–450)
PMV BLD AUTO: 8 FL (ref 5–10.5)
POTASSIUM SERPL-SCNC: 4.4 MMOL/L (ref 3.5–5.1)
RBC # BLD: 4.99 M/UL (ref 4–5.2)
SODIUM BLD-SCNC: 141 MMOL/L (ref 136–145)
TOTAL PROTEIN: 7.3 G/DL (ref 6.4–8.2)
TRIGL SERPL-MCNC: 208 MG/DL (ref 0–150)
TSH SERPL DL<=0.05 MIU/L-ACNC: 3.21 UIU/ML (ref 0.27–4.2)
VLDLC SERPL CALC-MCNC: 42 MG/DL
WBC # BLD: 4.8 K/UL (ref 4–11)

## 2021-04-13 LAB
ESTIMATED AVERAGE GLUCOSE: 257.5 MG/DL
HBA1C MFR BLD: 10.6 %

## 2021-04-25 PROBLEM — Z00.00 WELL ADULT EXAM: Status: RESOLVED | Noted: 2021-03-26 | Resolved: 2021-04-25

## 2021-06-02 ENCOUNTER — TELEPHONE (OUTPATIENT)
Dept: ORTHOPEDIC SURGERY | Age: 51
End: 2021-06-02

## 2021-08-14 ENCOUNTER — HOSPITAL ENCOUNTER (OUTPATIENT)
Dept: MRI IMAGING | Age: 51
Discharge: HOME | End: 2021-08-14
Payer: COMMERCIAL

## 2021-08-14 DIAGNOSIS — D32.0 BENIGN TUMOR OF CEREBRAL MENINGES (HCC): ICD-10-CM

## 2021-08-14 PROCEDURE — A9579 GAD-BASE MR CONTRAST NOS,1ML: HCPCS | Performed by: NEUROLOGICAL SURGERY

## 2021-08-14 PROCEDURE — 6360000004 HC RX CONTRAST MEDICATION: Performed by: NEUROLOGICAL SURGERY

## 2021-08-14 PROCEDURE — 70553 MRI BRAIN STEM W/O & W/DYE: CPT

## 2021-08-14 RX ADMIN — GADOTERIDOL 15 ML: 279.3 INJECTION, SOLUTION INTRAVENOUS at 08:05

## 2021-09-16 ENCOUNTER — TELEPHONE (OUTPATIENT)
Dept: FAMILY MEDICINE CLINIC | Age: 51
End: 2021-09-16

## 2021-09-16 DIAGNOSIS — E11.9 TYPE 2 DIABETES MELLITUS WITHOUT COMPLICATION, WITHOUT LONG-TERM CURRENT USE OF INSULIN (HCC): Primary | ICD-10-CM

## 2021-09-16 NOTE — TELEPHONE ENCOUNTER
----- Message from Milka Forbes sent at 9/16/2021 10:54 AM EDT -----  Subject: Referral Request    QUESTIONS   Reason for referral request? Pt. got a msg in [facility] that they need A1c   blood work done   Has the physician seen you for this condition before? Yes  Select a date? 2021-03-26  Select the Provider the patient wants to be referred to, if known (PCP or   Specialist)? May Hernandez   Preferred Specialist (if applicable)? Do you already have an appointment scheduled? No  Additional Information for Provider? Please call pt. with instructions on   blood work and what lab to go and what time and days the lab is open  ---------------------------------------------------------------------------  --------------  [address]  What is the best way for the office to contact you? OK to leave message on   voicemail  Preferred Call Back Phone Number?  [phone]

## 2021-10-04 RX ORDER — LINAGLIPTIN 5 MG/1
TABLET, FILM COATED ORAL
Qty: 30 TABLET | Refills: 3 | Status: SHIPPED | OUTPATIENT
Start: 2021-10-04 | End: 2022-02-17 | Stop reason: SDUPTHER

## 2021-11-12 RX ORDER — METFORMIN HYDROCHLORIDE 500 MG/1
TABLET, EXTENDED RELEASE ORAL
Qty: 360 TABLET | Refills: 2 | Status: SHIPPED | OUTPATIENT
Start: 2021-11-12 | End: 2022-08-15

## 2021-11-22 RX ORDER — ATENOLOL 25 MG/1
TABLET ORAL
Qty: 90 TABLET | Refills: 2 | Status: SHIPPED | OUTPATIENT
Start: 2021-11-22 | End: 2022-09-07 | Stop reason: SDUPTHER

## 2021-11-22 RX ORDER — LEVOTHYROXINE SODIUM 0.05 MG/1
TABLET ORAL
Qty: 90 TABLET | Refills: 2 | Status: SHIPPED | OUTPATIENT
Start: 2021-11-22 | End: 2022-09-07 | Stop reason: SDUPTHER

## 2022-02-17 NOTE — TELEPHONE ENCOUNTER
----- Message from Victorino Dylan sent at 2/17/2022  8:26 AM EST -----  Subject: Refill Request    QUESTIONS  Name of Medication? TRADJENTA 5 MG tablet  Patient-reported dosage and instructions? 5mg  How many days do you have left? 1  Preferred Pharmacy? [address] [place] [address] phone number (if available)? [phone]  Additional Information for Provider? Pt only has one day left called in   for a refill stated on Monday but went to pick it up Wed and nothing was   there .  ---------------------------------------------------------------------------  --------------  [address]  What is the best way for the office to contact you? OK to leave message on   voicemail  Preferred Call Back Phone Number?  [phone]

## 2022-03-03 ENCOUNTER — OFFICE VISIT (OUTPATIENT)
Dept: FAMILY MEDICINE CLINIC | Age: 52
End: 2022-03-03
Payer: COMMERCIAL

## 2022-03-03 VITALS
HEART RATE: 87 BPM | HEIGHT: 62 IN | DIASTOLIC BLOOD PRESSURE: 80 MMHG | BODY MASS INDEX: 27.49 KG/M2 | OXYGEN SATURATION: 98 % | SYSTOLIC BLOOD PRESSURE: 120 MMHG | WEIGHT: 149.4 LBS

## 2022-03-03 DIAGNOSIS — I10 ESSENTIAL HYPERTENSION, BENIGN: ICD-10-CM

## 2022-03-03 DIAGNOSIS — Z00.00 WELL ADULT EXAM: ICD-10-CM

## 2022-03-03 DIAGNOSIS — K57.30 COLON, DIVERTICULOSIS: ICD-10-CM

## 2022-03-03 DIAGNOSIS — E03.9 HYPOTHYROIDISM, UNSPECIFIED TYPE: ICD-10-CM

## 2022-03-03 DIAGNOSIS — E11.9 TYPE 2 DIABETES MELLITUS WITHOUT COMPLICATION, WITHOUT LONG-TERM CURRENT USE OF INSULIN (HCC): ICD-10-CM

## 2022-03-03 DIAGNOSIS — E78.2 MIXED HYPERLIPIDEMIA: ICD-10-CM

## 2022-03-03 DIAGNOSIS — Z00.00 WELL ADULT EXAM: Primary | ICD-10-CM

## 2022-03-03 DIAGNOSIS — Z86.018 HISTORY OF MENINGIOMA: ICD-10-CM

## 2022-03-03 DIAGNOSIS — Z00.00 ENCOUNTER FOR WELL ADULT EXAM WITHOUT ABNORMAL FINDINGS: ICD-10-CM

## 2022-03-03 DIAGNOSIS — E04.1 THYROID NODULE: ICD-10-CM

## 2022-03-03 DIAGNOSIS — K21.9 GASTROESOPHAGEAL REFLUX DISEASE WITHOUT ESOPHAGITIS: ICD-10-CM

## 2022-03-03 LAB
A/G RATIO: 2.4 (ref 1.1–2.2)
ALBUMIN SERPL-MCNC: 5.3 G/DL (ref 3.4–5)
ALP BLD-CCNC: 81 U/L (ref 40–129)
ALT SERPL-CCNC: 32 U/L (ref 10–40)
ANION GAP SERPL CALCULATED.3IONS-SCNC: 16 MMOL/L (ref 3–16)
AST SERPL-CCNC: 25 U/L (ref 15–37)
BASOPHILS ABSOLUTE: 0 K/UL (ref 0–0.2)
BASOPHILS RELATIVE PERCENT: 0.7 %
BILIRUB SERPL-MCNC: 0.5 MG/DL (ref 0–1)
BUN BLDV-MCNC: 14 MG/DL (ref 7–20)
CALCIUM SERPL-MCNC: 10.2 MG/DL (ref 8.3–10.6)
CHLORIDE BLD-SCNC: 104 MMOL/L (ref 99–110)
CHOLESTEROL, TOTAL: 159 MG/DL (ref 0–199)
CO2: 23 MMOL/L (ref 21–32)
CREAT SERPL-MCNC: 0.8 MG/DL (ref 0.6–1.1)
CREATININE URINE: 159.4 MG/DL (ref 28–259)
EOSINOPHILS ABSOLUTE: 0.1 K/UL (ref 0–0.6)
EOSINOPHILS RELATIVE PERCENT: 1.5 %
GFR AFRICAN AMERICAN: >60
GFR NON-AFRICAN AMERICAN: >60
GLUCOSE BLD-MCNC: 184 MG/DL (ref 70–99)
HCT VFR BLD CALC: 39.2 % (ref 36–48)
HDLC SERPL-MCNC: 39 MG/DL (ref 40–60)
HEMOGLOBIN: 13.2 G/DL (ref 12–16)
LDL CHOLESTEROL CALCULATED: 87 MG/DL
LYMPHOCYTES ABSOLUTE: 0.8 K/UL (ref 1–5.1)
LYMPHOCYTES RELATIVE PERCENT: 17.8 %
MCH RBC QN AUTO: 26.9 PG (ref 26–34)
MCHC RBC AUTO-ENTMCNC: 33.7 G/DL (ref 31–36)
MCV RBC AUTO: 79.9 FL (ref 80–100)
MICROALBUMIN UR-MCNC: <1.2 MG/DL
MICROALBUMIN/CREAT UR-RTO: NORMAL MG/G (ref 0–30)
MONOCYTES ABSOLUTE: 0.3 K/UL (ref 0–1.3)
MONOCYTES RELATIVE PERCENT: 6.4 %
NEUTROPHILS ABSOLUTE: 3.4 K/UL (ref 1.7–7.7)
NEUTROPHILS RELATIVE PERCENT: 73.6 %
PDW BLD-RTO: 13.8 % (ref 12.4–15.4)
PLATELET # BLD: 207 K/UL (ref 135–450)
PMV BLD AUTO: 7.7 FL (ref 5–10.5)
POTASSIUM SERPL-SCNC: 4.7 MMOL/L (ref 3.5–5.1)
RBC # BLD: 4.9 M/UL (ref 4–5.2)
SODIUM BLD-SCNC: 143 MMOL/L (ref 136–145)
T4 FREE: 1.4 NG/DL (ref 0.9–1.8)
TOTAL PROTEIN: 7.5 G/DL (ref 6.4–8.2)
TRIGL SERPL-MCNC: 167 MG/DL (ref 0–150)
TSH SERPL DL<=0.05 MIU/L-ACNC: 0.81 UIU/ML (ref 0.27–4.2)
VLDLC SERPL CALC-MCNC: 33 MG/DL
WBC # BLD: 4.7 K/UL (ref 4–11)

## 2022-03-03 PROCEDURE — 99396 PREV VISIT EST AGE 40-64: CPT | Performed by: FAMILY MEDICINE

## 2022-03-03 RX ORDER — CALCIUM CARBONATE 500(1250)
500 TABLET ORAL DAILY
COMMUNITY

## 2022-03-03 SDOH — ECONOMIC STABILITY: FOOD INSECURITY: WITHIN THE PAST 12 MONTHS, YOU WORRIED THAT YOUR FOOD WOULD RUN OUT BEFORE YOU GOT MONEY TO BUY MORE.: NEVER TRUE

## 2022-03-03 SDOH — ECONOMIC STABILITY: FOOD INSECURITY: WITHIN THE PAST 12 MONTHS, THE FOOD YOU BOUGHT JUST DIDN'T LAST AND YOU DIDN'T HAVE MONEY TO GET MORE.: NEVER TRUE

## 2022-03-03 ASSESSMENT — ENCOUNTER SYMPTOMS
RECTAL PAIN: 0
ABDOMINAL DISTENTION: 0
VOICE CHANGE: 0
BLOOD IN STOOL: 0
RHINORRHEA: 0
EYE REDNESS: 0
FACIAL SWELLING: 0
ABDOMINAL PAIN: 0
EYE ITCHING: 0
PHOTOPHOBIA: 0
TROUBLE SWALLOWING: 0
DIARRHEA: 0
EYE DISCHARGE: 0
EYE PAIN: 0
NAUSEA: 0
APNEA: 0
CONSTIPATION: 0
CHEST TIGHTNESS: 0
COUGH: 0
ANAL BLEEDING: 0
WHEEZING: 0
COLOR CHANGE: 0
SORE THROAT: 0
STRIDOR: 0
SHORTNESS OF BREATH: 0
SINUS PRESSURE: 0
CHOKING: 0
BACK PAIN: 0
VOMITING: 0

## 2022-03-03 ASSESSMENT — PATIENT HEALTH QUESTIONNAIRE - PHQ9
SUM OF ALL RESPONSES TO PHQ9 QUESTIONS 1 & 2: 0
2. FEELING DOWN, DEPRESSED OR HOPELESS: 0
1. LITTLE INTEREST OR PLEASURE IN DOING THINGS: 0
SUM OF ALL RESPONSES TO PHQ QUESTIONS 1-9: 0

## 2022-03-03 ASSESSMENT — SOCIAL DETERMINANTS OF HEALTH (SDOH): HOW HARD IS IT FOR YOU TO PAY FOR THE VERY BASICS LIKE FOOD, HOUSING, MEDICAL CARE, AND HEATING?: NOT HARD AT ALL

## 2022-03-03 NOTE — PROGRESS NOTES
Well Adult Note  Name: Kaitlyn Art Date: 3/3/2022   MRN: [id number] Sex: Female   Age: 46 y.o. Ethnicity: Non- / Non    : 1970 Race: White (non-)      Magdalene Freire is here for well adult exam.  History:  cpx  Cc-hyperglycemia despite diet and new meds-but asx-little exercise      Review of Systems   Constitutional: Negative for activity change, appetite change, chills, diaphoresis, fatigue, fever and unexpected weight change. HENT: Negative for congestion, dental problem, drooling, ear discharge, ear pain, facial swelling, hearing loss, mouth sores, nosebleeds, postnasal drip, rhinorrhea, sinus pressure, sneezing, sore throat, tinnitus, trouble swallowing and voice change. Eyes: Negative for photophobia, pain, discharge, redness, itching and visual disturbance. Respiratory: Negative for apnea, cough, choking, chest tightness, shortness of breath, wheezing and stridor. Cardiovascular: Negative for chest pain, palpitations and leg swelling. Gastrointestinal: Negative for abdominal distention, abdominal pain, anal bleeding, blood in stool, constipation, diarrhea, nausea, rectal pain and vomiting. Genitourinary: Negative for difficulty urinating, dysuria, enuresis, flank pain, frequency, genital sores and hematuria. Musculoskeletal: Negative for arthralgias, back pain, gait problem, joint swelling, myalgias, neck pain and neck stiffness. Skin: Negative for color change, pallor, rash and wound. Neurological: Negative for dizziness, tremors, seizures, syncope, facial asymmetry, speech difficulty, weakness, light-headedness, numbness and headaches. Hematological: Negative for adenopathy. Does not bruise/bleed easily. Psychiatric/Behavioral: Negative for agitation, behavioral problems, confusion, decreased concentration, dysphoric mood, hallucinations, self-injury, sleep disturbance and suicidal ideas.  The patient is not nervous/anxious and is not hyperactive. Allergies   Allergen Reactions    Fentanyl Itching    Hydrocodone Itching    Oxycodone-Acetaminophen Itching    Phenergan [Promethazine Hcl] Nausea Only    Sulfa Antibiotics      Red dots on face    Tramadol Itching       Prior to Visit Medications    Medication Sig Taking? Authorizing Provider   calcium carbonate (OSCAL) 500 MG TABS tablet Take 500 mg by mouth daily Yes Historical Provider, MD   CINNAMON PO Take by mouth Yes Historical Provider, MD   linagliptin (TRADJENTA) 5 MG tablet TAKE ONE TABLET BY MOUTH DAILY Yes Dillon Marie MD   levothyroxine (SYNTHROID) 50 MCG tablet TAKE ONE TABLET BY MOUTH DAILY Yes Dillon Marie MD   atenolol (TENORMIN) 25 MG tablet TAKE ONE TABLET BY MOUTH DAILY Yes Dillon Marie MD   metFORMIN (GLUCOPHAGE-XR) 500 MG extended release tablet TAKE TWO TABLETS BY MOUTH TWICE A DAY BEFORE MEALS Yes Dillon Marie MD   omeprazole (PRILOSEC) 40 MG capsule TAKE ONE CAPSULE BY MOUTH DAILY  Patient taking differently: nightly TAKE ONE CAPSULE BY MOUTH DAILY Yes Sonya Valero MD   vitamin D (CHOLECALCIFEROL) 1000 UNIT TABS tablet Take 1,000 Units by mouth daily  Patient not taking: Reported on 3/3/2022  Historical Provider, MD   naproxen (NAPROSYN) 500 MG tablet Take 1 tablet by mouth 2 times daily (with meals) for 30 days. Sonya Valero MD       Past Medical History:   Diagnosis Date    Colon, diverticulosis 12/27/2019    Diabetes mellitus ([facility] [address] [address] 10/11/2013    Erosive esophagitis 10/2011    Per EGD:Dr. Fany Wilder    Esophageal ring     Essential hypertension, benign 2/2012    GERD (gastroesophageal reflux disease)     Goiter Reaction 2/8/2012    Hemangioma 2/2012    Hepatic hemangioma 2/8/2012    large hypodensity in the poster segment of the right liver the  liver.      Hiatal hernia:small 9/6/2011    Hyperlipidemia 1/11/2012    Hypothyroidism 5/2013    under care of ENT:Dr. Rena Soto IFG (impaired fasting glucose) 2/8/2012  Meningioma ([facility] [address] 2012    Dr. Duran([facility]):    [facility]ening[facility]oma [facility])     Migraine     Ovarian cyst     Pap smear 2010;3/2011;3/2012;2014    Nml.:Under care of gyn    S/P endoscopy 10/12/11    Dr. Lonnie Raygoza esophagitis/small hiatal hernia/esophageal ring dilated t020mm    Schatzki's ring 2011    10/2011 dilated per GI:Dr. Juan Carlos Matta Screening mammogram 2010;2011;2012;13    Nml. 2013:Proscan=nml.  Thyroid follicular adenoma 3450    Thyroid nodule:follicular 6820    Thyroid nodules:largest=5.2cm x3.4cm 2011    Left lobe=1.5-1.2cm several nodules. Right lobe=1 large nodule:5.2cm X5.3VP nodule:bx=follicular tissue    Uncontrolled diabetes mellitus type 2 without complications 2640       Past Surgical History:   Procedure Laterality Date     SECTION  94,98    COSMETIC SURGERY  ,    tummy tuck    CRANIOTOMY Left 2018    LEFT FRONTAL CRANIOTOMY FOR RESECTION OF TUMOR performed by Joanna Magana MD at [address] [facility] LIPOSUCTION      MS SONO GUIDE NEEDLE BIOPSY      benign--sees [address] [place] [address]  2102       Family History   Problem Relation Age of Onset    Diabetes Mother     Diabetes Father     Heart Attack Father     Heart Disease Maternal Grandmother     Other Maternal Grandmother         Dementia    Cancer Paternal Grandmother        Social History     Tobacco Use    Smoking status: Never Smoker    Smokeless tobacco: Never Used   Vaping Use    Vaping Use: Never used   Substance Use Topics    Alcohol use:  Yes     Alcohol/week: 1.0 standard drink     Types: 1 Glasses of wine per week     Comment: occasional    Drug use: No       Objective   /80   Pulse 87   Ht 5' 2\" (1.575 m)   Wt 149 lb 6.4 oz (67.8 kg)   LMP 2020 (Approximate)   SpO2 98%   BMI 27.33 kg/m²   Wt Readings from Last 3 Encounters:   22 149 lb 6.4 oz (67.8 kg) 03/26/21 151 lb 12.8 oz (68.9 kg)   11/23/20 150 lb (68 kg)     There were no vitals filed for this visit. Physical Exam  Vitals reviewed. Constitutional:       General: She is not in acute distress. Appearance: She is well-developed. Eyes:      Conjunctiva/sclera: Conjunctivae normal.      Pupils: Pupils are equal, round, and reactive to light. Neck:      Thyroid: No thyromegaly. Vascular: No JVD. Trachea: No tracheal deviation. Cardiovascular:      Rate and Rhythm: Normal rate and regular rhythm. Heart sounds: Normal heart sounds. No murmur heard. No gallop. Pulmonary:      Effort: Pulmonary effort is normal. No respiratory distress. Breath sounds: Normal breath sounds. No stridor. No wheezing or rales. Chest:      Chest wall: No tenderness. Abdominal:      General: Bowel sounds are normal. There is no distension. Palpations: Abdomen is soft. There is no mass. Tenderness: There is no abdominal tenderness. Musculoskeletal:         General: No tenderness. Lymphadenopathy:      Cervical: No cervical adenopathy. Skin:     General: Skin is warm and dry. Coloration: Skin is not pale. Findings: No erythema or rash. Neurological:      Mental Status: She is alert and oriented to person, place, and time. Cranial Nerves: No cranial nerve deficit. Motor: No abnormal muscle tone. Coordination: Coordination normal.      Deep Tendon Reflexes: Reflexes normal.   Psychiatric:         Behavior: Behavior normal.         Thought Content: Thought content normal.         Judgment: Judgment normal.           Assessment   Plan   1. Colon, diverticulosis  Assessment & Plan:   Well-controlled, continue current treatment plan  2. Essential hypertension, benign  Assessment & Plan:   Well-controlled, continue current medications-TOS/SE discussed  3.  Gastroesophageal reflux disease without esophagitis  Assessment & Plan:   Well-controlled, continue current medications--will try to wean  4. Hypothyroidism, unspecified type  Assessment & Plan:   Unclear control, changes made today: recheck u/s and labs  5. History of meningioma  Assessment & Plan:   Well-controlled, continue current treatment plan-stable since removal--sees neurosurgery yrly  6. Mixed hyperlipidemia  Assessment & Plan:   Diet alone  7. Thyroid nodules:largest=5.2cm x3.4cm  Assessment & Plan:   Recheck u/s  8.  Type 2 diabetes mellitus without complication, without long-term current use of insulin ([facility] [address] [address]  Assessment & Plan:   Uncontrolled, changes made today: labs--may need to change meds         Personalized Preventive Plan   Current Health Maintenance Status  Immunization History   Administered Date(s) Administered    COVID-19, Pfizer Purple top, DILUTE for use, 12+ yrs, 30mcg/0.3mL dose 06/07/2021, 06/28/2021, 01/03/2022    Influenza Virus Vaccine 10/04/2013, 10/17/2018, 01/03/2022    Influenza Whole 10/01/2012    Influenza, Quadv, IM, PF (6 mo and older Fluzone, Flulaval, Fluarix, and 3 yrs and older Afluria) 11/23/2020    Pneumococcal Polysaccharide ([id number]) 05/11/2016    Tdap (Boostrix, Adacel) 08/11/2015    Zoster Recombinant (Shingrix) 11/23/2020, 03/26/2021        Health Maintenance   Topic Date Due    Hepatitis B vaccine (1 of 3 - Risk 3-dose series) Never done    Cervical cancer screen  Never done    Breast cancer screen  04/28/2018    Diabetic retinal exam  11/07/2021    Diabetic microalbuminuria test  11/23/2021    Diabetic foot exam  03/26/2022    Depression Screen  03/26/2022    Lipid screen  04/12/2022    TSH testing  04/12/2022    A1C test (Diabetic or Prediabetic)  09/18/2022    Potassium monitoring  09/18/2022    Creatinine monitoring  09/18/2022    DTaP/Tdap/Td vaccine (2 - Td or Tdap) 08/11/2025    Colorectal Cancer Screen  12/20/2029    Pneumococcal 0-64 years Vaccine (2 of 2 - PPSV23) 04/28/2033    Flu vaccine  Completed    Shingles Vaccine Completed    COVID-19 Vaccine  Completed    Hepatitis C screen  Completed    HIV screen  Addressed    Hepatitis A vaccine  Aged Out    Hib vaccine  Aged Out    Meningococcal (ACWY) vaccine  Aged Out     Recommendations for [place] Due: see orders and patient instructions/AVS.    No follow-ups on file.

## 2022-03-04 LAB
ESTIMATED AVERAGE GLUCOSE: 220.2 MG/DL
HBA1C MFR BLD: 9.3 %

## 2022-03-04 RX ORDER — ORAL SEMAGLUTIDE 3 MG/1
3 TABLET ORAL DAILY
Qty: 30 TABLET | Refills: 3 | COMMUNITY
Start: 2022-03-04 | End: 2022-04-04

## 2022-03-04 RX ORDER — ORAL SEMAGLUTIDE 7 MG/1
7 TABLET ORAL DAILY
Qty: 30 TABLET | Refills: 0 | COMMUNITY
Start: 2022-03-04 | End: 2022-03-04 | Stop reason: ALTCHOICE

## 2022-03-11 ENCOUNTER — HOSPITAL ENCOUNTER (OUTPATIENT)
Dept: ULTRASOUND IMAGING | Age: 52
Discharge: HOME | End: 2022-03-11
Payer: COMMERCIAL

## 2022-03-11 DIAGNOSIS — E04.1 THYROID NODULE: ICD-10-CM

## 2022-03-11 DIAGNOSIS — E03.9 HYPOTHYROIDISM, UNSPECIFIED TYPE: ICD-10-CM

## 2022-03-11 DIAGNOSIS — Z00.00 WELL ADULT EXAM: ICD-10-CM

## 2022-03-11 PROCEDURE — 76536 US EXAM OF HEAD AND NECK: CPT

## 2022-04-02 PROBLEM — Z00.00 WELL ADULT EXAM: Status: RESOLVED | Noted: 2021-03-26 | Resolved: 2022-04-02

## 2022-04-04 ENCOUNTER — OFFICE VISIT (OUTPATIENT)
Dept: FAMILY MEDICINE CLINIC | Age: 52
End: 2022-04-04
Payer: COMMERCIAL

## 2022-04-04 VITALS
WEIGHT: 147 LBS | DIASTOLIC BLOOD PRESSURE: 80 MMHG | SYSTOLIC BLOOD PRESSURE: 120 MMHG | HEART RATE: 89 BPM | OXYGEN SATURATION: 98 % | HEIGHT: 62 IN | BODY MASS INDEX: 27.05 KG/M2

## 2022-04-04 DIAGNOSIS — E11.9 TYPE 2 DIABETES MELLITUS WITHOUT COMPLICATION, WITHOUT LONG-TERM CURRENT USE OF INSULIN (HCC): ICD-10-CM

## 2022-04-04 DIAGNOSIS — E03.9 HYPOTHYROIDISM, UNSPECIFIED TYPE: ICD-10-CM

## 2022-04-04 DIAGNOSIS — E04.1 THYROID NODULE: ICD-10-CM

## 2022-04-04 PROCEDURE — 3046F HEMOGLOBIN A1C LEVEL >9.0%: CPT | Performed by: FAMILY MEDICINE

## 2022-04-04 PROCEDURE — 99213 OFFICE O/P EST LOW 20 MIN: CPT | Performed by: FAMILY MEDICINE

## 2022-04-04 RX ORDER — ORAL SEMAGLUTIDE 7 MG/1
7 TABLET ORAL DAILY
Qty: 30 TABLET | Refills: 3 | Status: SHIPPED | OUTPATIENT
Start: 2022-04-04

## 2022-04-04 ASSESSMENT — ENCOUNTER SYMPTOMS
SORE THROAT: 0
ABDOMINAL DISTENTION: 0
WHEEZING: 0
VOICE CHANGE: 0
ABDOMINAL PAIN: 0
BLOOD IN STOOL: 0
SINUS PRESSURE: 0
ANAL BLEEDING: 0
SHORTNESS OF BREATH: 0
VOMITING: 0
EYE REDNESS: 0
TROUBLE SWALLOWING: 0
RECTAL PAIN: 0
APNEA: 0
PHOTOPHOBIA: 0
CONSTIPATION: 0
CHOKING: 0
RHINORRHEA: 0
EYE PAIN: 0
STRIDOR: 0
COUGH: 0
EYE ITCHING: 0
COLOR CHANGE: 0
NAUSEA: 0
EYE DISCHARGE: 0
CHEST TIGHTNESS: 0
BACK PAIN: 0
DIARRHEA: 0
FACIAL SWELLING: 0

## 2022-04-04 ASSESSMENT — PATIENT HEALTH QUESTIONNAIRE - PHQ9
SUM OF ALL RESPONSES TO PHQ QUESTIONS 1-9: 0
SUM OF ALL RESPONSES TO PHQ QUESTIONS 1-9: 0
2. FEELING DOWN, DEPRESSED OR HOPELESS: 0
SUM OF ALL RESPONSES TO PHQ QUESTIONS 1-9: 0
SUM OF ALL RESPONSES TO PHQ9 QUESTIONS 1 & 2: 0
SUM OF ALL RESPONSES TO PHQ QUESTIONS 1-9: 0
1. LITTLE INTEREST OR PLEASURE IN DOING THINGS: 0

## 2022-04-04 NOTE — PROGRESS NOTES
Subjective:     Patient Nicole Enamorado is a 46 y.o. female. Diabetes  She presents for her follow-up diabetic visit. She has type 2 diabetes mellitus. No MedicAlert identification noted. Her disease course has been stable. There are no hypoglycemic associated symptoms. Pertinent negatives for hypoglycemia include no confusion, dizziness, headaches, nervousness/anxiousness, pallor, seizures, speech difficulty or tremors. There are no diabetic associated symptoms. Pertinent negatives for diabetes include no chest pain, no fatigue and no weakness. There are no hypoglycemic complications. There are no diabetic complications. There are no known risk factors for coronary artery disease. When asked about current treatments, none were reported. She is compliant with treatment all of the time. She is following a generally healthy diet. Meal planning includes avoidance of concentrated sweets. She has not had a previous visit with a dietitian. She rarely participates in exercise. Her overall blood glucose range is 140-180 mg/dl. An ACE inhibitor/angiotensin II receptor blocker is not being taken. She does not see a podiatrist.Eye exam is not current.        Allergies   Allergen Reactions    Fentanyl Itching    Hydrocodone Itching    Oxycodone-Acetaminophen Itching    Phenergan [Promethazine Hcl] Nausea Only    Sulfa Antibiotics      Red dots on face    Tramadol Itching       Current Outpatient Medications   Medication Sig Dispense Refill    Semaglutide (RYBELSUS) 7 MG TABS Take 7 mg by mouth daily 30 tablet 3    calcium carbonate (OSCAL) 500 MG TABS tablet Take 500 mg by mouth daily      CINNAMON PO Take by mouth      levothyroxine (SYNTHROID) 50 MCG tablet TAKE ONE TABLET BY MOUTH DAILY 90 tablet 2    atenolol (TENORMIN) 25 MG tablet TAKE ONE TABLET BY MOUTH DAILY 90 tablet 2    metFORMIN (GLUCOPHAGE-XR) 500 MG extended release tablet TAKE TWO TABLETS BY MOUTH TWICE A DAY BEFORE MEALS 360 tablet 2    vitamin D (CHOLECALCIFEROL) 1000 UNIT TABS tablet Take 1,000 Units by mouth daily       omeprazole (PRILOSEC) 40 MG capsule TAKE ONE CAPSULE BY MOUTH DAILY (Patient taking differently: nightly TAKE ONE CAPSULE BY MOUTH DAILY) 90 capsule 0     No current facility-administered medications for this visit. Past Medical History:   Diagnosis Date    Colon, diverticulosis 2019    Diabetes mellitus ([address] 10/11/2013    Erosive esophagitis 10/2011    Per EGD:Dr. Khloe Schafer    Esophageal ring     Essential hypertension, benign 2012    GERD (gastroesophageal reflux disease)     Goiter Reaction 2012    Hemangioma 2012    Hepatic hemangioma 2012    large hypodensity in the poster segment of the right liver the  liver.  Hiatal hernia:small 2011    Hyperlipidemia 2012    Hypothyroidism 2013    under care of ENT:Dr. Usman Darling IFG (impaired fasting glucose) 2012    Meningioma ([facility] [address] 2012    Dr. Duran([facility]):    [facility])     Migraine     Ovarian cyst     Pap smear 2010;3/2011;3/2012;2014    Nml.:Under care of gyn    S/P endoscopy 10/12/11    Dr. Mikey Walker esophagitis/small hiatal hernia/esophageal ring dilated t020mm    Schatzki's ring 2011    10/2011 dilated per GI:Dr. Ian Kraft Screening mammogram 2010;2011;2012;13    Nml. 2013:Proscan=nml. 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  Right lobe=1 large nodule:5.2cm G0.0HU nodule:bx=follicular tissue    Uncontrolled diabetes mellitus type 2 without complications        Past Surgical History:   Procedure Laterality Date     SECTION  94,98    COSMETIC SURGERY  ,    tummy tuck    CRANIOTOMY Left 2018    LEFT FRONTAL CRANIOTOMY FOR RESECTION OF TUMOR performed by Collette [facility]ulveda [facility] at [facility] CURETTAGE OF UTERUS      LIPOSUCTION      IA SONO GUIDE NEEDLE BIOPSY  2011    benign--felicia Morrow    VENTRAL HERNIA REPAIR  12-       Social History     Socioeconomic History    Marital status:      Spouse name: Not on file    Number of children: 2    Years of education: Not on file    Highest education level: Not on file   Occupational History    Occupation: ; Marketing   Tobacco Use    Smoking status: Never Smoker    Smokeless tobacco: Never Used   Vaping Use    Vaping Use: Never used   Substance and Sexual Activity    Alcohol use: Yes     Alcohol/week: 1.0 standard drink     Types: 1 Glasses of wine per week     Comment: occasional    Drug use: No    Sexual activity: Yes     Partners: Male   Other Topics Concern    Not on file   Social History Narrative    Works at [employer]--likes job     happily and both kids here    Fed"Mobile Location, [place]"     Social Determinants of Health     Financial Resource Strain: Low Risk     Difficulty of Paying Living Expenses: Not hard at all   Food Insecurity: No Food Insecurity    Worried About [address] in the Last Year: Never true    [address] in the Last Year: Never true   Transportation Needs:     Lack of Transportation (Medical): Not on file    Lack of Transportation (Non-Medical):  Not on file   Physical Activity:     Days of Exercise per Week: Not on file    Minutes of Exercise per Session: Not on file   Stress:     Feeling of Stress : Not on file   Social Connections:     Frequency of Communication with Friends and Family: Not on file    Frequency of Social Gatherings with Friends and Family: Not on file    Attends Jew Services: Not on file    Active Member of Clubs or Organizations: Not on file    Attends Club or Organization Meetings: Not on file    Marital Status: Not on file   Intimate Partner Violence:     Fear of Current or Ex-Partner: Not on file    Emotionally Abused: Not on file  Physically Abused: Not on file    Sexually Abused: Not on file   Housing Stability:     Unable to Pay for Housing in the Last Year: Not on file    Number of Places Lived in the Last Year: Not on file    Unstable Housing in the Last Year: Not on file       Family History   Problem Relation Age of Onset    Diabetes Mother     Diabetes Father     Heart Attack Father     Heart Disease Maternal Grandmother     Other Maternal Grandmother         Dementia    Cancer Paternal Grandmother        Immunization History   Administered Date(s) Administered    COVID-19, Pfizer Purple top, DILUTE for use, 12+ yrs, 30mcg/0.3mL dose 06/07/2021, 06/28/2021, 01/03/2022    Influenza Virus Vaccine 10/04/2013, 10/17/2018, 01/03/2022    Influenza Whole 10/01/2012    Influenza, Quadv, IM, PF (6 mo and older Fluzone, Flulaval, Fluarix, and 3 yrs and older Afluria) 11/23/2020    Pneumococcal Polysaccharide ([id number]) 05/11/2016    Tdap (Boostrix, Adacel) 08/11/2015    Zoster Recombinant (Shingrix) 11/23/2020, 03/26/2021       Review of Systems  Review of Systems   Constitutional: Negative for activity change, appetite change, chills, diaphoresis, fatigue, fever and unexpected weight change. HENT: Negative for congestion, dental problem, drooling, ear discharge, ear pain, facial swelling, hearing loss, mouth sores, nosebleeds, postnasal drip, rhinorrhea, sinus pressure, sneezing, sore throat, tinnitus, trouble swallowing and voice change. Eyes: Negative for photophobia, pain, discharge, redness, itching and visual disturbance. Respiratory: Negative for apnea, cough, choking, chest tightness, shortness of breath, wheezing and stridor. Cardiovascular: Negative for chest pain, palpitations and leg swelling. Gastrointestinal: Negative for abdominal distention, abdominal pain, anal bleeding, blood in stool, constipation, diarrhea, nausea, rectal pain and vomiting.    Genitourinary: Negative for difficulty urinating, dysuria, enuresis, flank pain, frequency, genital sores and hematuria. Musculoskeletal: Negative for arthralgias, back pain, gait problem, joint swelling, myalgias, neck pain and neck stiffness. Skin: Negative for color change, pallor, rash and wound. Neurological: Negative for dizziness, tremors, seizures, syncope, facial asymmetry, speech difficulty, weakness, light-headedness, numbness and headaches. Hematological: Negative for adenopathy. Does not bruise/bleed easily. Psychiatric/Behavioral: Negative for agitation, behavioral problems, confusion, decreased concentration, dysphoric mood, hallucinations, self-injury, sleep disturbance and suicidal ideas. The patient is not nervous/anxious and is not hyperactive. Objective:   Physical Exam  Physical Exam  Vitals reviewed. Constitutional:       General: She is not in acute distress. Appearance: She is well-developed. Eyes:      Conjunctiva/sclera: Conjunctivae normal.      Pupils: Pupils are equal, round, and reactive to light. Neck:      Thyroid: No thyromegaly. Vascular: No JVD. Trachea: No tracheal deviation. Cardiovascular:      Rate and Rhythm: Normal rate and regular rhythm. Heart sounds: Normal heart sounds. No murmur heard. No gallop. Pulmonary:      Effort: Pulmonary effort is normal. No respiratory distress. Breath sounds: Normal breath sounds. No stridor. No wheezing or rales. Chest:      Chest wall: No tenderness. Abdominal:      General: Bowel sounds are normal. There is no distension. Palpations: Abdomen is soft. There is no mass. Tenderness: There is no abdominal tenderness. Musculoskeletal:         General: No tenderness. Lymphadenopathy:      Cervical: No cervical adenopathy. Skin:     General: Skin is warm and dry. Coloration: Skin is not pale. Findings: No erythema or rash.    Neurological:      Mental Status: She is alert and oriented to person, place, and time. Cranial Nerves: No cranial nerve deficit. Motor: No abnormal muscle tone. Coordination: Coordination normal.      Deep Tendon Reflexes: Reflexes normal.   Psychiatric:         Behavior: Behavior normal.         Thought Content:  Thought content normal.         Judgment: Judgment normal.         Assessment and Plan:     Hypothyroidism   Well-controlled, continue current medications    Thyroid nodules:largest=5.2cm x3.4cm   unchanged--recheck in 1 y    Type 2 diabetes mellitus without complication, without long-term current use of insulin ([facility] [address]   tolerating rybelsus--and numbers are better--^dose to 7 mg-touch base with numbers in 1 m

## 2022-08-15 RX ORDER — METFORMIN HYDROCHLORIDE 500 MG/1
TABLET, EXTENDED RELEASE ORAL
Qty: 360 TABLET | Refills: 0 | Status: SHIPPED | OUTPATIENT
Start: 2022-08-15

## 2022-09-07 RX ORDER — ATENOLOL 25 MG/1
TABLET ORAL
Qty: 90 TABLET | Refills: 2 | Status: SHIPPED | OUTPATIENT
Start: 2022-09-07

## 2022-09-07 RX ORDER — LEVOTHYROXINE SODIUM 0.05 MG/1
TABLET ORAL
Qty: 90 TABLET | Refills: 2 | Status: SHIPPED | OUTPATIENT
Start: 2022-09-07

## 2022-11-14 RX ORDER — ORAL SEMAGLUTIDE 7 MG/1
TABLET ORAL
Qty: 30 TABLET | Refills: 3 | Status: SHIPPED | OUTPATIENT
Start: 2022-11-14

## 2022-12-05 RX ORDER — METFORMIN HYDROCHLORIDE 500 MG/1
TABLET, EXTENDED RELEASE ORAL
Qty: 360 TABLET | Refills: 0 | Status: SHIPPED | OUTPATIENT
Start: 2022-12-05

## 2023-01-12 ENCOUNTER — OFFICE VISIT (OUTPATIENT)
Dept: FAMILY MEDICINE CLINIC | Age: 53
End: 2023-01-12
Payer: COMMERCIAL

## 2023-01-12 VITALS
SYSTOLIC BLOOD PRESSURE: 130 MMHG | OXYGEN SATURATION: 96 % | TEMPERATURE: 97.7 F | WEIGHT: 144.8 LBS | DIASTOLIC BLOOD PRESSURE: 80 MMHG | HEART RATE: 90 BPM | BODY MASS INDEX: 26.65 KG/M2 | HEIGHT: 62 IN

## 2023-01-12 DIAGNOSIS — K57.30 COLON, DIVERTICULOSIS: ICD-10-CM

## 2023-01-12 DIAGNOSIS — Z00.00 ENCOUNTER FOR WELL ADULT EXAM WITHOUT ABNORMAL FINDINGS: Primary | ICD-10-CM

## 2023-01-12 DIAGNOSIS — E03.9 HYPOTHYROIDISM, UNSPECIFIED TYPE: ICD-10-CM

## 2023-01-12 DIAGNOSIS — E78.2 MIXED HYPERLIPIDEMIA: ICD-10-CM

## 2023-01-12 DIAGNOSIS — E11.9 TYPE 2 DIABETES MELLITUS WITHOUT COMPLICATION, WITHOUT LONG-TERM CURRENT USE OF INSULIN (HCC): ICD-10-CM

## 2023-01-12 DIAGNOSIS — I10 ESSENTIAL HYPERTENSION, BENIGN: ICD-10-CM

## 2023-01-12 DIAGNOSIS — L40.9 PSORIASIS: ICD-10-CM

## 2023-01-12 DIAGNOSIS — E04.1 THYROID NODULE: ICD-10-CM

## 2023-01-12 DIAGNOSIS — Z00.00 WELL ADULT EXAM: ICD-10-CM

## 2023-01-12 LAB
A/G RATIO: 2 (ref 1.1–2.2)
ALBUMIN SERPL-MCNC: 5.2 G/DL (ref 3.4–5)
ALP BLD-CCNC: 93 U/L (ref 40–129)
ALT SERPL-CCNC: 14 U/L (ref 10–40)
ANION GAP SERPL CALCULATED.3IONS-SCNC: 17 MMOL/L (ref 3–16)
AST SERPL-CCNC: 15 U/L (ref 15–37)
BASOPHILS ABSOLUTE: 0 K/UL (ref 0–0.2)
BASOPHILS RELATIVE PERCENT: 0.4 %
BILIRUB SERPL-MCNC: 0.7 MG/DL (ref 0–1)
BUN BLDV-MCNC: 14 MG/DL (ref 7–20)
CALCIUM SERPL-MCNC: 10.2 MG/DL (ref 8.3–10.6)
CHLORIDE BLD-SCNC: 100 MMOL/L (ref 99–110)
CHOLESTEROL, TOTAL: 187 MG/DL (ref 0–199)
CO2: 25 MMOL/L (ref 21–32)
CREAT SERPL-MCNC: 0.8 MG/DL (ref 0.6–1.1)
CREATININE URINE: 99.6 MG/DL (ref 28–259)
EOSINOPHILS ABSOLUTE: 0.1 K/UL (ref 0–0.6)
EOSINOPHILS RELATIVE PERCENT: 1.5 %
GFR SERPL CREATININE-BSD FRML MDRD: >60 ML/MIN/{1.73_M2}
GLUCOSE BLD-MCNC: 114 MG/DL (ref 70–99)
HCT VFR BLD CALC: 40.9 % (ref 36–48)
HDLC SERPL-MCNC: 44 MG/DL (ref 40–60)
HEMOGLOBIN: 13.7 G/DL (ref 12–16)
LDL CHOLESTEROL CALCULATED: 114 MG/DL
LYMPHOCYTES ABSOLUTE: 2 K/UL (ref 1–5.1)
LYMPHOCYTES RELATIVE PERCENT: 25.2 %
MCH RBC QN AUTO: 27.1 PG (ref 26–34)
MCHC RBC AUTO-ENTMCNC: 33.5 G/DL (ref 31–36)
MCV RBC AUTO: 80.7 FL (ref 80–100)
MICROALBUMIN UR-MCNC: <1.2 MG/DL
MICROALBUMIN/CREAT UR-RTO: NORMAL MG/G (ref 0–30)
MONOCYTES ABSOLUTE: 0.5 K/UL (ref 0–1.3)
MONOCYTES RELATIVE PERCENT: 6 %
NEUTROPHILS ABSOLUTE: 5.3 K/UL (ref 1.7–7.7)
NEUTROPHILS RELATIVE PERCENT: 66.9 %
PDW BLD-RTO: 13.9 % (ref 12.4–15.4)
PLATELET # BLD: 257 K/UL (ref 135–450)
PMV BLD AUTO: 7.8 FL (ref 5–10.5)
POTASSIUM SERPL-SCNC: 3.9 MMOL/L (ref 3.5–5.1)
RBC # BLD: 5.07 M/UL (ref 4–5.2)
SODIUM BLD-SCNC: 142 MMOL/L (ref 136–145)
T4 FREE: 1.3 NG/DL (ref 0.9–1.8)
TOTAL PROTEIN: 7.8 G/DL (ref 6.4–8.2)
TRIGL SERPL-MCNC: 145 MG/DL (ref 0–150)
TSH SERPL DL<=0.05 MIU/L-ACNC: 1.31 UIU/ML (ref 0.27–4.2)
VLDLC SERPL CALC-MCNC: 29 MG/DL
WBC # BLD: 7.9 K/UL (ref 4–11)

## 2023-01-12 PROCEDURE — 99396 PREV VISIT EST AGE 40-64: CPT | Performed by: FAMILY MEDICINE

## 2023-01-12 PROCEDURE — 3079F DIAST BP 80-89 MM HG: CPT | Performed by: FAMILY MEDICINE

## 2023-01-12 PROCEDURE — 3075F SYST BP GE 130 - 139MM HG: CPT | Performed by: FAMILY MEDICINE

## 2023-01-12 RX ORDER — ORAL SEMAGLUTIDE 3 MG/1
TABLET ORAL
Qty: 45 TABLET | Refills: 5 | Status: SHIPPED | OUTPATIENT
Start: 2023-01-12

## 2023-01-12 ASSESSMENT — ENCOUNTER SYMPTOMS
STRIDOR: 0
SINUS PRESSURE: 0
SHORTNESS OF BREATH: 0
BLOOD IN STOOL: 0
DIARRHEA: 0
CHEST TIGHTNESS: 0
EYE DISCHARGE: 0
WHEEZING: 0
CHOKING: 0
RHINORRHEA: 0
SORE THROAT: 0
FACIAL SWELLING: 0
CONSTIPATION: 0
COUGH: 0
ABDOMINAL DISTENTION: 0
EYE REDNESS: 0
VOICE CHANGE: 0
TROUBLE SWALLOWING: 0
RECTAL PAIN: 0
EYE PAIN: 0
NAUSEA: 0
APNEA: 0
BACK PAIN: 0
VOMITING: 0
COLOR CHANGE: 0
ABDOMINAL PAIN: 0
ANAL BLEEDING: 0
EYE ITCHING: 0
PHOTOPHOBIA: 0

## 2023-01-12 ASSESSMENT — PATIENT HEALTH QUESTIONNAIRE - PHQ9
1. LITTLE INTEREST OR PLEASURE IN DOING THINGS: 0
2. FEELING DOWN, DEPRESSED OR HOPELESS: 0
SUM OF ALL RESPONSES TO PHQ QUESTIONS 1-9: 0
SUM OF ALL RESPONSES TO PHQ9 QUESTIONS 1 & 2: 0

## 2023-01-12 NOTE — ASSESSMENT & PLAN NOTE
Looked over home numbers and better but can't tolerate 7mg--will try 3 mg tabs and use 1.5 tabs daily--con't metformin

## 2023-01-12 NOTE — PROGRESS NOTES
Well Adult Note  Name: Toño Singh Date: 2023   MRN: [id number] Sex: Female   Age: 45 y.o. Ethnicity: Non- / Non    : 1970 Race: White (non-)      Bisi Tijerina is here for well adult exam.  History:  Cpx  Cc-psoriasis on ears,scalp  DM--see meds probs with 7 of rybellsus but can take 3.5      Review of Systems   Constitutional:  Negative for activity change, appetite change, chills, diaphoresis, fatigue, fever and unexpected weight change. HENT:  Negative for congestion, dental problem, drooling, ear discharge, ear pain, facial swelling, hearing loss, mouth sores, nosebleeds, postnasal drip, rhinorrhea, sinus pressure, sneezing, sore throat, tinnitus, trouble swallowing and voice change. Eyes:  Negative for photophobia, pain, discharge, redness, itching and visual disturbance. Respiratory:  Negative for apnea, cough, choking, chest tightness, shortness of breath, wheezing and stridor. Cardiovascular:  Negative for chest pain, palpitations and leg swelling. Gastrointestinal:  Negative for abdominal distention, abdominal pain, anal bleeding, blood in stool, constipation, diarrhea, nausea, rectal pain and vomiting. Genitourinary:  Negative for difficulty urinating, dysuria, enuresis, flank pain, frequency, genital sores and hematuria. Musculoskeletal:  Negative for arthralgias, back pain, gait problem, joint swelling, myalgias, neck pain and neck stiffness. Skin:  Positive for rash. Negative for color change, pallor and wound. Neurological:  Negative for dizziness, tremors, seizures, syncope, facial asymmetry, speech difficulty, weakness, light-headedness, numbness and headaches. Hematological:  Negative for adenopathy. Does not bruise/bleed easily. Psychiatric/Behavioral:  Negative for agitation, behavioral problems, confusion, decreased concentration, dysphoric mood, hallucinations, self-injury, sleep disturbance and suicidal ideas.  The patient is not nervous/anxious and is not hyperactive. Allergies   Allergen Reactions    Fentanyl Itching    Hydrocodone Itching    Oxycodone-Acetaminophen Itching    Phenergan [Promethazine Hcl] Nausea Only    Sulfa Antibiotics      Red dots on face    Tramadol Itching         Prior to Visit Medications    Medication Sig Taking? Authorizing Provider   Semaglutide (RYBELSUS) 3 MG TABS 1.5 tabs daily Yes Dixie Wan MD   hydrocortisone 2.5 % cream Apply topically 2 times daily. Yes Dixie Wan MD   metFORMIN (GLUCOPHAGE-XR) 500 MG extended release tablet TAKE TWO TABLETS BY MOUTH TWICE A DAY BEFORE MEALS Yes Dixie Wan MD   atenolol (TENORMIN) 25 MG tablet TAKE ONE TABLET BY MOUTH DAILY Yes TASHA Snyder CNP   levothyroxine (SYNTHROID) 50 MCG tablet TAKE ONE TABLET BY MOUTH DAILY Yes TASHA Caraballo CNP   calcium carbonate (OSCAL) 500 MG TABS tablet Take 500 mg by mouth daily Yes Historical Provider, MD   CINNAMON PO Take by mouth Yes Historical Provider, MD   vitamin D (CHOLECALCIFEROL) 1000 UNIT TABS tablet Take 1,000 Units by mouth daily  Yes Historical Provider, MD   omeprazole (PRILOSEC) 40 MG capsule TAKE ONE CAPSULE BY MOUTH DAILY  Patient taking differently: nightly TAKE ONE CAPSULE BY MOUTH DAILY Yes Rodger Barton MD   naproxen (NAPROSYN) 500 MG tablet Take 1 tablet by mouth 2 times daily (with meals) for 30 days. Chanelle Duff MD         Past Medical History:   Diagnosis Date    Colon, diverticulosis 12/27/2019    Diabetes mellitus ([facility] [address] 10/11/2013    Erosive esophagitis 10/2011    Per EGD:Dr. Guevara Strauss    Esophageal ring     Essential hypertension, benign 2/2012    GERD (gastroesophageal reflux disease)     Goiter Reaction 2/8/2012    Hemangioma 2/2012    Hepatic hemangioma 2/8/2012    large hypodensity in the poster segment of the right liver the  liver.      Hiatal hernia:small 9/6/2011    Hyperlipidemia 1/11/2012    Hypothyroidism 5/2013 under care of ENT:Dr. Frazier Homans    IFG (impaired fasting glucose) 2012    Meningioma ([address] 2012    Dr. Duran([facility]):    Meningioma [facility])     Migraine     Ovarian cyst     Pap smear 2010;3/2011;3/2012;2014    Nml.:Under care of gyn    S/P endoscopy 10/12/11    Dr. Asael Simon esophagitis/small hiatal hernia/esophageal ring dilated t020mm    Schatzki's ring 2011    10/2011 dilated per GI:Dr. Kaelyn Marvin    Screening mammogram 2010;2011;2012;13    Nml. 2013:Proscan=nml. Thyroid follicular adenoma 9260    Thyroid nodule:follicular 9282    Thyroid nodules:largest=5.2cm x3.4cm 2011    Left lobe=1.5-1.2cm several nodules. Right lobe=1 large nodule:5.2cm J0.7CO nodule:bx=follicular tissue    Uncontrolled diabetes mellitus type 2 without complications 1282       Past Surgical History:   Procedure Laterality Date     SECTION  94,98    CHG US GUIDANCE NEEDLE PLACEMENT IMG S&I      benign--sees [place]    COLONOSCOPY  2019    [place]--divertics--repeat in 10    COSMETIC SURGERY  2002    tummy tuck    CRANIOTOMY Left 2018    LEFT FRONTAL CRANIOTOMY FOR RESECTION OF TUMOR performed by Rosa Celaya MD at [place] [address]  2102         Family History   Problem Relation Age of Onset    Diabetes Mother     Diabetes Father     Heart Attack Father     Heart Disease Maternal Grandmother     Other Maternal Grandmother         Dementia    Cancer Paternal Grandmother        Social History     Tobacco Use    Smoking status: Never    Smokeless tobacco: Never   Vaping Use    Vaping Use: Never used   Substance Use Topics    Alcohol use:  Yes     Alcohol/week: 1.0 standard drink     Types: 1 Glasses of wine per week     Comment: occasional    Drug use: No       Objective   /80   Pulse 90   Temp 97.7 °F (36.5 °C)   Ht 5' 2\" (1.575 m)   Wt 144 lb 12.8 oz (65.7 kg) LMP 09/09/2020 (Approximate)   SpO2 96%   BMI 26.48 kg/m²   Wt Readings from Last 3 Encounters:   01/12/23 144 lb 12.8 oz (65.7 kg)   04/04/22 147 lb (66.7 kg)   03/03/22 149 lb 6.4 oz (67.8 kg)     There were no vitals filed for this visit. Physical Exam  Vitals reviewed. Constitutional:       General: She is not in acute distress. Appearance: She is well-developed. HENT:      Head: Normocephalic. Right Ear: Tympanic membrane and ear canal normal.      Left Ear: Tympanic membrane and ear canal normal.      Nose: No rhinorrhea. Mouth/Throat:      Pharynx: No oropharyngeal exudate or posterior oropharyngeal erythema. Eyes:      General:         Right eye: No discharge. Left eye: No discharge. Conjunctiva/sclera: Conjunctivae normal.      Pupils: Pupils are equal, round, and reactive to light. Neck:      Thyroid: No thyromegaly. Vascular: No carotid bruit or JVD. Trachea: No tracheal deviation. Cardiovascular:      Rate and Rhythm: Normal rate and regular rhythm. Pulses: Normal pulses. Heart sounds: Normal heart sounds. No murmur heard. No gallop. Pulmonary:      Effort: Pulmonary effort is normal. No respiratory distress. Breath sounds: Normal breath sounds. No stridor. No wheezing or rales. Chest:      Chest wall: No tenderness. Abdominal:      General: Bowel sounds are normal. There is no distension. Palpations: Abdomen is soft. There is no mass. Tenderness: There is no abdominal tenderness. There is no right CVA tenderness, left CVA tenderness, guarding or rebound. Hernia: No hernia is present. Musculoskeletal:         General: No swelling, tenderness, deformity or signs of injury. Cervical back: Normal range of motion. No rigidity or tenderness. Right lower leg: No edema. Left lower leg: No edema. Lymphadenopathy:      Cervical: No cervical adenopathy. Skin:     General: Skin is warm and dry. Coloration: Skin is not pale. Findings: Rash (psoriatic plaques on ears and scalp) present. No erythema. Neurological:      Mental Status: She is alert and oriented to person, place, and time. Cranial Nerves: No cranial nerve deficit. Motor: No weakness or abnormal muscle tone. Coordination: Coordination normal.      Gait: Gait normal.      Deep Tendon Reflexes: Reflexes normal.   Psychiatric:         Mood and Affect: Mood normal.         Behavior: Behavior normal.         Thought Content: Thought content normal.         Judgment: Judgment normal.         Assessment   Plan   1. Thyroid nodule  Assessment & Plan:  Recheck after 3/23   Orders:  -     US THYROID; Future  2. Type 2 diabetes mellitus without complication, without long-term current use of insulin (HCC)  Assessment & Plan:   Looked over home numbers and better but can't tolerate 7mg--will try 3 mg tabs and use 1.5 tabs daily--con't metformin  3. Hypothyroidism, unspecified type  Assessment & Plan:   labs  4. Mixed hyperlipidemia  Assessment & Plan:   Unclear control, changes made today: labs  5. Essential hypertension, benign  Assessment & Plan:   Unclear control, changes made today: home numbers high  6. Colon, diverticulosis  Assessment & Plan:   Discussed diet and complications  7.  Psoriasis  Assessment & Plan:   HC cream         Personalized Preventive Plan   Current Health Maintenance Status  Immunization History   Administered Date(s) Administered    COVID-19, PFIZER Bivalent BOOSTER, DO NOT Dilute, (age 12y+), IM, 30 mcg/0.3 mL 12/05/2022    COVID-19, PFIZER PURPLE top, DILUTE for use, (age 15 y+), 30mcg/0.3mL 06/07/2021, 06/28/2021, 01/03/2022    Influenza Virus Vaccine 10/04/2013, 10/17/2018, 01/03/2022    Influenza Whole 10/01/2012    Influenza, FLUARIX, FLULAVAL, FLUZONE (age 10 mo+) AND AFLURIA, (age 1 y+), PF, 0.5mL 11/23/2020    Pneumococcal Polysaccharide ([id number]) 05/11/2016    Tdap (Boostrix, Adacel) 08/11/2015 Zoster Recombinant (Shingrix) 11/23/2020, 03/26/2021        Health Maintenance   Topic Date Due    Hepatitis B vaccine (1 of 3 - Risk 3-dose series) Never done    Cervical cancer screen  Never done    Breast cancer screen  04/28/2018    Diabetic retinal exam  11/07/2021    A1C test (Diabetic or Prediabetic)  06/03/2022    Diabetic foot exam  03/03/2023    Diabetic Alb to Cr ratio (uACR) test  03/03/2023    Lipids  03/03/2023    GFR test (Diabetes, CKD 3-4, OR last GFR 15-59)  03/03/2023    Depression Screen  04/04/2023    DTaP/Tdap/Td vaccine (2 - Td or Tdap) 08/11/2025    Colorectal Cancer Screen  12/20/2029    Flu vaccine  Completed    Shingles vaccine  Completed    Pneumococcal 0-64 years Vaccine  Completed    COVID-19 Vaccine  Completed    Hepatitis C screen  Completed    HIV screen  Addressed    Hepatitis A vaccine  Aged Out    Hib vaccine  Aged Out    Meningococcal (ACWY) vaccine  Aged Out     Recommendations for [employer] Due: see orders and patient instructions/AVS.    No follow-ups on file.

## 2023-01-13 LAB
ESTIMATED AVERAGE GLUCOSE: 139.9 MG/DL
HBA1C MFR BLD: 6.5 %

## 2023-02-11 PROBLEM — Z00.00 WELL ADULT EXAM: Status: RESOLVED | Noted: 2021-03-26 | Resolved: 2023-02-11

## 2023-02-20 RX ORDER — ORAL SEMAGLUTIDE 3 MG/1
TABLET ORAL
Qty: 45 TABLET | Refills: 5 | Status: SHIPPED | OUTPATIENT
Start: 2023-02-20

## 2023-02-20 NOTE — TELEPHONE ENCOUNTER
Pharmacy needs clarification on directions for:    Rybelsus  Per  pills can't be cut up. Please call pharmacy.

## 2023-02-24 RX ORDER — METFORMIN HYDROCHLORIDE 500 MG/1
TABLET, EXTENDED RELEASE ORAL
Qty: 360 TABLET | Refills: 0 | Status: SHIPPED | OUTPATIENT
Start: 2023-02-24

## 2023-03-06 ENCOUNTER — TELEPHONE (OUTPATIENT)
Dept: FAMILY MEDICINE CLINIC | Age: 53
End: 2023-03-06

## 2023-03-06 RX ORDER — ORAL SEMAGLUTIDE 7 MG/1
7 TABLET ORAL DAILY
Qty: 30 TABLET | Refills: 3 | Status: SHIPPED | OUTPATIENT
Start: 2023-03-06

## 2023-03-06 NOTE — TELEPHONE ENCOUNTER
Pharmacy will not fill rybelsys because it should not be cut in half and is prescribed as 1.5 tablets daily, this was originally prescribed on 2/2/23 and pt is running out of medication.

## 2023-03-13 ENCOUNTER — HOSPITAL ENCOUNTER (OUTPATIENT)
Dept: ULTRASOUND IMAGING | Age: 53
Discharge: HOME | End: 2023-03-13
Payer: COMMERCIAL

## 2023-03-13 ENCOUNTER — PATIENT MESSAGE (OUTPATIENT)
Dept: FAMILY MEDICINE CLINIC | Age: 53
End: 2023-03-13

## 2023-03-13 DIAGNOSIS — E04.2 MULTIPLE THYROID NODULES: Primary | ICD-10-CM

## 2023-03-13 DIAGNOSIS — E04.1 THYROID NODULE: ICD-10-CM

## 2023-03-13 PROCEDURE — 76536 US EXAM OF HEAD AND NECK: CPT

## 2023-03-14 DIAGNOSIS — E04.1 THYROID NODULE: Primary | ICD-10-CM

## 2023-03-14 NOTE — TELEPHONE ENCOUNTER
From: Ariella Ricks  To: Dr. Millie Heaton  Sent: 3/13/2023 7:10 PM EDT  Subject: today's thyroid ultrasound results    I saw your message regarding the results from today's ultrasound in which they suggest doing another biopsy. I'd like to get that scheduled as soon as possible, please.

## 2023-03-20 ENCOUNTER — HOSPITAL ENCOUNTER (OUTPATIENT)
Dept: ULTRASOUND IMAGING | Age: 53
Discharge: HOME | End: 2023-03-20

## 2023-03-20 DIAGNOSIS — E04.1 THYROID NODULE: ICD-10-CM

## 2023-03-20 NOTE — PROGRESS NOTES
Thyroid ultrasound of 3/13/23 was reviewed and discussed with Dr. Addison Dela Cruz and Mrs. Socorro Saldana. All of the dominant nodules have decreased in size since 2022. TiRads criteria are often over-aggressive in patients with multinodular goiter. Continued ultrasound surveillance is most appropriate in this instance, as there are no imaging features which are highly suspicious for malignancy.

## 2023-05-08 RX ORDER — ORAL SEMAGLUTIDE 7 MG/1
7 TABLET ORAL DAILY
Qty: 90 TABLET | Refills: 3 | Status: SHIPPED | OUTPATIENT
Start: 2023-05-08 | End: 2023-08-06

## 2023-05-08 NOTE — TELEPHONE ENCOUNTER
Requested Prescriptions     Pending Prescriptions Disp Refills    Semaglutide (RYBELSUS) 7 MG TABS 90 tablet 3     Sig: Take 7 mg by mouth daily          Last Office Visit: 1/12/2023     Next Office Visit: Visit date not found     Last Labs:  1/12/23

## 2023-05-22 RX ORDER — METFORMIN HYDROCHLORIDE 500 MG/1
TABLET, EXTENDED RELEASE ORAL
Qty: 360 TABLET | Refills: 0 | Status: SHIPPED | OUTPATIENT
Start: 2023-05-22

## 2023-05-31 RX ORDER — METFORMIN HYDROCHLORIDE 500 MG/1
TABLET, EXTENDED RELEASE ORAL
Qty: 360 TABLET | Refills: 0 | Status: SHIPPED | OUTPATIENT
Start: 2023-05-31

## 2023-06-06 PROBLEM — E11.3299 MILD NONPROLIFERATIVE DIABETIC RETINOPATHY WITHOUT MACULAR EDEMA ASSOCIATED WITH TYPE 2 DIABETES MELLITUS (HCC): Status: ACTIVE | Noted: 2023-06-06

## 2023-06-08 RX ORDER — LEVOTHYROXINE SODIUM 0.05 MG/1
TABLET ORAL
Qty: 90 TABLET | Refills: 2 | Status: SHIPPED | OUTPATIENT
Start: 2023-06-08

## 2023-06-08 RX ORDER — ATENOLOL 25 MG/1
TABLET ORAL
Qty: 90 TABLET | Refills: 2 | Status: SHIPPED | OUTPATIENT
Start: 2023-06-08

## 2023-06-28 ENCOUNTER — TELEPHONE (OUTPATIENT)
Dept: FAMILY MEDICINE CLINIC | Age: 53
End: 2023-06-28

## 2023-06-28 RX ORDER — ATENOLOL 25 MG/1
25 TABLET ORAL DAILY
Qty: 90 TABLET | Refills: 2 | Status: SHIPPED | OUTPATIENT
Start: 2023-06-28

## 2023-06-28 RX ORDER — LEVOTHYROXINE SODIUM 0.05 MG/1
50 TABLET ORAL DAILY
Qty: 90 TABLET | Refills: 2 | Status: SHIPPED | OUTPATIENT
Start: 2023-06-28

## 2023-06-28 NOTE — TELEPHONE ENCOUNTER
----- Message from Angela Campos sent at 6/28/2023 10:36 AM EDT -----  Subject: Refill Request    QUESTIONS  Name of Medication? levothyroxine (SYNTHROID) 50 MCG tablet  Patient-reported dosage and instructions? TAKE ONE TABLET BY MOUTH DAILY  How many days do you have left? 14  Preferred Pharmacy? [id number]  Pharmacy phone number (if available)? [phone]  Additional Information for Provider? 90day supply   ---------------------------------------------------------------------------  --------------,  Name of Medication? atenolol (TENORMIN) 25 MG tablet  Patient-reported dosage and instructions? TAKE ONE TABLET BY MOUTH DAILY  How many days do you have left? 14  Preferred Pharmacy? [facility] #[id number]  Pharmacy phone number (if available)? [phone]  Additional Information for Provider? 90days  ---------------------------------------------------------------------------  --------------  CALL BACK INFO  What is the best way for the office to contact you? OK to leave message on   voicemail  Preferred Call Back Phone Number? [phone]  ---------------------------------------------------------------------------  --------------  SCRIPT ANSWERS  Relationship to Patient?  Self

## 2023-07-26 ENCOUNTER — OFFICE VISIT (OUTPATIENT)
Dept: FAMILY MEDICINE CLINIC | Age: 53
End: 2023-07-26
Payer: COMMERCIAL

## 2023-07-26 VITALS
HEIGHT: 62 IN | HEART RATE: 82 BPM | BODY MASS INDEX: 25.51 KG/M2 | TEMPERATURE: 97.3 F | DIASTOLIC BLOOD PRESSURE: 80 MMHG | SYSTOLIC BLOOD PRESSURE: 118 MMHG | WEIGHT: 138.6 LBS | OXYGEN SATURATION: 99 %

## 2023-07-26 DIAGNOSIS — I10 ESSENTIAL HYPERTENSION, BENIGN: ICD-10-CM

## 2023-07-26 DIAGNOSIS — E11.9 TYPE 2 DIABETES MELLITUS WITHOUT COMPLICATION, WITHOUT LONG-TERM CURRENT USE OF INSULIN (HCC): ICD-10-CM

## 2023-07-26 DIAGNOSIS — E11.9 TYPE 2 DIABETES MELLITUS WITHOUT COMPLICATION, WITHOUT LONG-TERM CURRENT USE OF INSULIN (HCC): Primary | ICD-10-CM

## 2023-07-26 DIAGNOSIS — E11.3291 MILD NONPROLIFERATIVE DIABETIC RETINOPATHY OF RIGHT EYE WITHOUT MACULAR EDEMA ASSOCIATED WITH TYPE 2 DIABETES MELLITUS (HCC): ICD-10-CM

## 2023-07-26 LAB
ANION GAP SERPL CALCULATED.3IONS-SCNC: 14 MMOL/L (ref 3–16)
BUN SERPL-MCNC: 13 MG/DL (ref 7–20)
CALCIUM SERPL-MCNC: 9.7 MG/DL (ref 8.3–10.6)
CHLORIDE SERPL-SCNC: 101 MMOL/L (ref 99–110)
CO2 SERPL-SCNC: 26 MMOL/L (ref 21–32)
CREAT SERPL-MCNC: 0.9 MG/DL (ref 0.6–1.1)
GFR SERPLBLD CREATININE-BSD FMLA CKD-EPI: >60 ML/MIN/{1.73_M2}
GLUCOSE SERPL-MCNC: 102 MG/DL (ref 70–99)
POTASSIUM SERPL-SCNC: 4.4 MMOL/L (ref 3.5–5.1)
SODIUM SERPL-SCNC: 141 MMOL/L (ref 136–145)

## 2023-07-26 PROCEDURE — 3079F DIAST BP 80-89 MM HG: CPT | Performed by: FAMILY MEDICINE

## 2023-07-26 PROCEDURE — 99213 OFFICE O/P EST LOW 20 MIN: CPT | Performed by: FAMILY MEDICINE

## 2023-07-26 PROCEDURE — 3074F SYST BP LT 130 MM HG: CPT | Performed by: FAMILY MEDICINE

## 2023-07-26 PROCEDURE — 3044F HG A1C LEVEL LT 7.0%: CPT | Performed by: FAMILY MEDICINE

## 2023-07-26 ASSESSMENT — ENCOUNTER SYMPTOMS
BACK PAIN: 0
NAUSEA: 0
PHOTOPHOBIA: 0
APNEA: 0
ANAL BLEEDING: 0
EYE DISCHARGE: 0
FACIAL SWELLING: 0
SORE THROAT: 0
VOICE CHANGE: 0
ABDOMINAL PAIN: 0
BLOOD IN STOOL: 0
STRIDOR: 0
TROUBLE SWALLOWING: 0
EYE ITCHING: 0
RECTAL PAIN: 0
ABDOMINAL DISTENTION: 0
COLOR CHANGE: 0
CHEST TIGHTNESS: 0
CHOKING: 0
COUGH: 0
VOMITING: 0
WHEEZING: 0
CONSTIPATION: 0
SHORTNESS OF BREATH: 0
EYE PAIN: 0
SINUS PRESSURE: 0
DIARRHEA: 0
EYE REDNESS: 0
RHINORRHEA: 0

## 2023-07-26 NOTE — PROGRESS NOTES
Tympanic membrane and ear canal normal.      Left Ear: Tympanic membrane and ear canal normal.      Nose: No rhinorrhea. Mouth/Throat:      Pharynx: No oropharyngeal exudate or posterior oropharyngeal erythema. Eyes:      General:         Right eye: No discharge. Left eye: No discharge. Conjunctiva/sclera: Conjunctivae normal.      Pupils: Pupils are equal, round, and reactive to light. Neck:      Thyroid: No thyromegaly. Vascular: No carotid bruit or JVD. Trachea: No tracheal deviation. Cardiovascular:      Rate and Rhythm: Normal rate and regular rhythm. Pulses: Normal pulses. Heart sounds: Normal heart sounds. No murmur heard. No gallop. Pulmonary:      Effort: Pulmonary effort is normal. No respiratory distress. Breath sounds: Normal breath sounds. No stridor. No wheezing or rales. Chest:      Chest wall: No tenderness. Abdominal:      General: Bowel sounds are normal. There is no distension. Palpations: Abdomen is soft. There is no mass. Tenderness: There is no abdominal tenderness. There is no right CVA tenderness, left CVA tenderness, guarding or rebound. Hernia: No hernia is present. Musculoskeletal:         General: No swelling, tenderness, deformity or signs of injury. Cervical back: Normal range of motion. No rigidity or tenderness. Right lower leg: No edema. Left lower leg: No edema. Lymphadenopathy:      Cervical: No cervical adenopathy. Skin:     General: Skin is warm and dry. Coloration: Skin is not pale. Findings: No erythema or rash. Neurological:      Mental Status: She is alert and oriented to person, place, and time. Cranial Nerves: No cranial nerve deficit. Motor: No weakness or abnormal muscle tone.       Coordination: Coordination normal.      Gait: Gait normal.      Deep Tendon Reflexes: Reflexes normal.   Psychiatric:         Mood and Affect: Mood normal.         Behavior:

## 2023-07-27 LAB
EST. AVERAGE GLUCOSE BLD GHB EST-MCNC: 134.1 MG/DL
HBA1C MFR BLD: 6.3 %

## 2023-08-30 RX ORDER — METFORMIN HYDROCHLORIDE 500 MG/1
TABLET, EXTENDED RELEASE ORAL
Qty: 360 TABLET | Refills: 0 | Status: SHIPPED | OUTPATIENT
Start: 2023-08-30 | End: 2023-09-02

## 2023-09-02 RX ORDER — METFORMIN HYDROCHLORIDE 500 MG/1
TABLET, EXTENDED RELEASE ORAL
Qty: 360 TABLET | Refills: 0 | Status: SHIPPED | OUTPATIENT
Start: 2023-09-02

## 2024-01-23 RX ORDER — ORAL SEMAGLUTIDE 7 MG/1
TABLET ORAL
Qty: 90 TABLET | Refills: 3 | Status: SHIPPED | OUTPATIENT
Start: 2024-01-23

## 2024-01-27 NOTE — H&P
Pulse 69   Temp 98.3 °F (36.8 °C) (Oral)   Resp 16   Ht 5' 2\" (1.575 m)   Wt 144 lb (65.3 kg)   LMP 12/03/2018 (Approximate)   SpO2 97%   BMI 26.34 kg/m²      Heart:  regular rate and rhythm, no murmur    Lungs:  No increased work of breathing, good air exchange, clear to auscultation bilaterally, no crackles or wheezing    Abdomen:  soft, non-distended, non-tender, no rebound tenderness or guarding, normal active bowel sounds and no masses palpated    ASSESSMENT AND PLAN:    1. Patient seen and focused exam done today- no new changes since last physical exam on 11-    2. Access to ancillary services are available per request of the provider.     Luis Enrique Bartholomew     12/11/2018 weakness

## 2024-01-29 ENCOUNTER — TELEPHONE (OUTPATIENT)
Dept: FAMILY MEDICINE CLINIC | Age: 54
End: 2024-01-29

## 2024-01-29 RX ORDER — ORAL SEMAGLUTIDE 7 MG/1
TABLET ORAL
Qty: 90 TABLET | Refills: 3 | Status: SHIPPED | OUTPATIENT
Start: 2024-01-29

## 2024-01-29 NOTE — TELEPHONE ENCOUNTER
----- Message from Daly Rahman sent at 1/23/2024 10:13 AM EST -----  Subject: Refill Request    QUESTIONS  Name of Medication? Semaglutide (RYBELSUS) 7 MG TABS  Patient-reported dosage and instructions? once daily   How many days do you have left? 14  Preferred Pharmacy? [id number]  Pharmacy phone number (if available)? [phone]  Additional Information for Provider? [facility] says this prescription   would need to be reviewed on or after March 1st. Pt will be sending in the   paperwork via [employer].   ---------------------------------------------------------------------------  --------------  CALL BACK INFO  What is the best way for the office to contact you? OK to leave message on   voicemail  Preferred Call Back Phone Number? [phone]  ---------------------------------------------------------------------------  --------------  SCRIPT ANSWERS  Relationship to Patient? Self

## 2024-02-22 RX ORDER — ATENOLOL 25 MG/1
25 TABLET ORAL DAILY
Qty: 90 TABLET | Refills: 2 | Status: SHIPPED | OUTPATIENT
Start: 2024-02-22

## 2024-02-22 RX ORDER — LEVOTHYROXINE SODIUM 0.05 MG/1
50 TABLET ORAL DAILY
Qty: 90 TABLET | Refills: 2 | Status: SHIPPED | OUTPATIENT
Start: 2024-02-22

## 2024-03-26 RX ORDER — METFORMIN HYDROCHLORIDE 500 MG/1
TABLET, EXTENDED RELEASE ORAL
Qty: 360 TABLET | Refills: 0 | Status: SHIPPED | OUTPATIENT
Start: 2024-03-26

## 2024-04-22 ENCOUNTER — OFFICE VISIT (OUTPATIENT)
Dept: FAMILY MEDICINE CLINIC | Age: 54
End: 2024-04-22
Payer: COMMERCIAL

## 2024-04-22 VITALS
HEIGHT: 62 IN | SYSTOLIC BLOOD PRESSURE: 124 MMHG | HEART RATE: 76 BPM | DIASTOLIC BLOOD PRESSURE: 80 MMHG | OXYGEN SATURATION: 97 % | BODY MASS INDEX: 26.65 KG/M2 | WEIGHT: 144.8 LBS

## 2024-04-22 DIAGNOSIS — I10 ESSENTIAL HYPERTENSION, BENIGN: ICD-10-CM

## 2024-04-22 DIAGNOSIS — E11.9 TYPE 2 DIABETES MELLITUS WITHOUT COMPLICATION, WITHOUT LONG-TERM CURRENT USE OF INSULIN (HCC): ICD-10-CM

## 2024-04-22 DIAGNOSIS — E03.9 HYPOTHYROIDISM, UNSPECIFIED TYPE: ICD-10-CM

## 2024-04-22 DIAGNOSIS — E78.2 MIXED HYPERLIPIDEMIA: ICD-10-CM

## 2024-04-22 DIAGNOSIS — Z86.018 HISTORY OF MENINGIOMA: ICD-10-CM

## 2024-04-22 DIAGNOSIS — L40.9 PSORIASIS: ICD-10-CM

## 2024-04-22 DIAGNOSIS — Z12.31 ENCOUNTER FOR SCREENING MAMMOGRAM FOR MALIGNANT NEOPLASM OF BREAST: ICD-10-CM

## 2024-04-22 DIAGNOSIS — E04.1 THYROID NODULE: ICD-10-CM

## 2024-04-22 DIAGNOSIS — K44.9 HIATAL HERNIA: ICD-10-CM

## 2024-04-22 DIAGNOSIS — Z71.89 ACP (ADVANCE CARE PLANNING): ICD-10-CM

## 2024-04-22 DIAGNOSIS — K21.9 GASTROESOPHAGEAL REFLUX DISEASE WITHOUT ESOPHAGITIS: ICD-10-CM

## 2024-04-22 DIAGNOSIS — Z00.00 ENCOUNTER FOR WELL ADULT EXAM WITHOUT ABNORMAL FINDINGS: Primary | ICD-10-CM

## 2024-04-22 LAB
25(OH)D3 SERPL-MCNC: 25.4 NG/ML
ALBUMIN SERPL-MCNC: 4.8 G/DL (ref 3.4–5)
ALBUMIN/GLOB SERPL: 2.3 {RATIO} (ref 1.1–2.2)
ALP SERPL-CCNC: 84 U/L (ref 40–129)
ALT SERPL-CCNC: 18 U/L (ref 10–40)
ANION GAP SERPL CALCULATED.3IONS-SCNC: 14 MMOL/L (ref 3–16)
AST SERPL-CCNC: 18 U/L (ref 15–37)
BASOPHILS # BLD: 0 K/UL (ref 0–0.2)
BASOPHILS NFR BLD: 0.5 %
BILIRUB SERPL-MCNC: 0.6 MG/DL (ref 0–1)
BUN SERPL-MCNC: 10 MG/DL (ref 7–20)
CALCIUM SERPL-MCNC: 9.2 MG/DL (ref 8.3–10.6)
CHLORIDE SERPL-SCNC: 102 MMOL/L (ref 99–110)
CHOLEST SERPL-MCNC: 148 MG/DL (ref 0–199)
CO2 SERPL-SCNC: 27 MMOL/L (ref 21–32)
CREAT SERPL-MCNC: 0.8 MG/DL (ref 0.6–1.1)
CREAT UR-MCNC: 130.7 MG/DL (ref 28–259)
DEPRECATED RDW RBC AUTO: 14.3 % (ref 12.4–15.4)
EOSINOPHIL # BLD: 0.1 K/UL (ref 0–0.6)
EOSINOPHIL NFR BLD: 1.6 %
GFR SERPLBLD CREATININE-BSD FMLA CKD-EPI: 86 ML/MIN/{1.73_M2}
GLUCOSE SERPL-MCNC: 113 MG/DL (ref 70–99)
HCT VFR BLD AUTO: 37.1 % (ref 36–48)
HDLC SERPL-MCNC: 30 MG/DL (ref 40–60)
HGB BLD-MCNC: 12.6 G/DL (ref 12–16)
LDLC SERPL CALC-MCNC: 73 MG/DL
LYMPHOCYTES # BLD: 1.7 K/UL (ref 1–5.1)
LYMPHOCYTES NFR BLD: 31.2 %
MAGNESIUM SERPL-MCNC: 1.4 MG/DL (ref 1.8–2.4)
MCH RBC QN AUTO: 27.3 PG (ref 26–34)
MCHC RBC AUTO-ENTMCNC: 34 G/DL (ref 31–36)
MCV RBC AUTO: 80.5 FL (ref 80–100)
MICROALBUMIN UR DL<=1MG/L-MCNC: <1.2 MG/DL
MICROALBUMIN/CREAT UR: NORMAL MG/G (ref 0–30)
MONOCYTES # BLD: 0.3 K/UL (ref 0–1.3)
MONOCYTES NFR BLD: 5.8 %
NEUTROPHILS # BLD: 3.4 K/UL (ref 1.7–7.7)
NEUTROPHILS NFR BLD: 60.9 %
PLATELET # BLD AUTO: 233 K/UL (ref 135–450)
PMV BLD AUTO: 8.1 FL (ref 5–10.5)
POTASSIUM SERPL-SCNC: 4.2 MMOL/L (ref 3.5–5.1)
PROT SERPL-MCNC: 6.9 G/DL (ref 6.4–8.2)
RBC # BLD AUTO: 4.61 M/UL (ref 4–5.2)
SODIUM SERPL-SCNC: 143 MMOL/L (ref 136–145)
T4 FREE SERPL-MCNC: 1.4 NG/DL (ref 0.9–1.8)
TRIGL SERPL-MCNC: 227 MG/DL (ref 0–150)
TSH SERPL DL<=0.005 MIU/L-ACNC: 1.74 UIU/ML (ref 0.27–4.2)
VIT B12 SERPL-MCNC: 236 PG/ML (ref 211–911)
VLDLC SERPL CALC-MCNC: 45 MG/DL
WBC # BLD AUTO: 5.6 K/UL (ref 4–11)

## 2024-04-22 PROCEDURE — 3079F DIAST BP 80-89 MM HG: CPT | Performed by: FAMILY MEDICINE

## 2024-04-22 PROCEDURE — 3074F SYST BP LT 130 MM HG: CPT | Performed by: FAMILY MEDICINE

## 2024-04-22 PROCEDURE — 99396 PREV VISIT EST AGE 40-64: CPT | Performed by: FAMILY MEDICINE

## 2024-04-22 SDOH — ECONOMIC STABILITY: FOOD INSECURITY: WITHIN THE PAST 12 MONTHS, YOU WORRIED THAT YOUR FOOD WOULD RUN OUT BEFORE YOU GOT MONEY TO BUY MORE.: NEVER TRUE

## 2024-04-22 SDOH — ECONOMIC STABILITY: HOUSING INSECURITY
IN THE LAST 12 MONTHS, WAS THERE A TIME WHEN YOU DID NOT HAVE A STEADY PLACE TO SLEEP OR SLEPT IN A SHELTER (INCLUDING NOW)?: NO

## 2024-04-22 SDOH — ECONOMIC STABILITY: FOOD INSECURITY: WITHIN THE PAST 12 MONTHS, THE FOOD YOU BOUGHT JUST DIDN'T LAST AND YOU DIDN'T HAVE MONEY TO GET MORE.: NEVER TRUE

## 2024-04-22 ASSESSMENT — ENCOUNTER SYMPTOMS
ABDOMINAL DISTENTION: 0
ABDOMINAL PAIN: 0
FACIAL SWELLING: 0
BLOOD IN STOOL: 0
BACK PAIN: 0
NAUSEA: 0
WHEEZING: 0
COUGH: 0
VOICE CHANGE: 0
COLOR CHANGE: 0
PHOTOPHOBIA: 0
SHORTNESS OF BREATH: 0
DIARRHEA: 0
STRIDOR: 0
CHOKING: 0
EYE PAIN: 0
EYE REDNESS: 0
RHINORRHEA: 0
VOMITING: 0
APNEA: 0
SORE THROAT: 0
RECTAL PAIN: 0
ANAL BLEEDING: 0
EYE ITCHING: 0
CONSTIPATION: 0
SINUS PRESSURE: 0
EYE DISCHARGE: 0
CHEST TIGHTNESS: 0
TROUBLE SWALLOWING: 0

## 2024-04-22 ASSESSMENT — PATIENT HEALTH QUESTIONNAIRE - PHQ9
SUM OF ALL RESPONSES TO PHQ QUESTIONS 1-9: 0
SUM OF ALL RESPONSES TO PHQ9 QUESTIONS 1 & 2: 0
1. LITTLE INTEREST OR PLEASURE IN DOING THINGS: NOT AT ALL
SUM OF ALL RESPONSES TO PHQ QUESTIONS 1-9: 0
SUM OF ALL RESPONSES TO PHQ QUESTIONS 1-9: 0
2. FEELING DOWN, DEPRESSED OR HOPELESS: NOT AT ALL
SUM OF ALL RESPONSES TO PHQ QUESTIONS 1-9: 0

## 2024-04-22 NOTE — PROGRESS NOTES
Well Adult Note  Name: Tania Colindres Today’s Date: 2024   MRN: [id number] Sex: Female   Age: 53 y.o. Ethnicity: Non- / Non    : 1970 Race: White (non-)      Tania Colindres is here for well adult exam.  History:  Cpx  No cc    Review of Systems   Constitutional:  Negative for activity change, appetite change, chills, diaphoresis, fatigue, fever and unexpected weight change.   HENT:  Negative for congestion, dental problem, drooling, ear discharge, ear pain, facial swelling, hearing loss, mouth sores, nosebleeds, postnasal drip, rhinorrhea, sinus pressure, sneezing, sore throat, tinnitus, trouble swallowing and voice change.    Eyes:  Negative for photophobia, pain, discharge, redness, itching and visual disturbance.   Respiratory:  Negative for apnea, cough, choking, chest tightness, shortness of breath, wheezing and stridor.    Cardiovascular:  Negative for chest pain, palpitations and leg swelling.   Gastrointestinal:  Negative for abdominal distention, abdominal pain, anal bleeding, blood in stool, constipation, diarrhea, nausea, rectal pain and vomiting.   Genitourinary:  Positive for dyspareunia. Negative for difficulty urinating, dysuria, enuresis, flank pain, frequency, genital sores and hematuria.   Musculoskeletal:  Negative for arthralgias, back pain, gait problem, joint swelling, myalgias, neck pain and neck stiffness.   Skin:  Negative for color change, pallor, rash and wound.        Chronic scalp/ext canal rash   Neurological:  Negative for dizziness, tremors, seizures, syncope, facial asymmetry, speech difficulty, weakness, light-headedness, numbness and headaches.   Hematological:  Negative for adenopathy. Does not bruise/bleed easily.   Psychiatric/Behavioral:  Negative for agitation, behavioral problems, confusion, decreased concentration, dysphoric mood, hallucinations, self-injury, sleep disturbance and suicidal ideas. The patient is not nervous/anxious and 
none

## 2024-04-22 NOTE — PATIENT INSTRUCTIONS

## 2024-04-23 LAB
EST. AVERAGE GLUCOSE BLD GHB EST-MCNC: 134.1 MG/DL
HBA1C MFR BLD: 6.3 %

## 2024-05-08 ENCOUNTER — HOSPITAL ENCOUNTER (OUTPATIENT)
Dept: ULTRASOUND IMAGING | Age: 54
Discharge: HOME | End: 2024-05-08
Attending: FAMILY MEDICINE
Payer: COMMERCIAL

## 2024-05-08 PROCEDURE — 76536 US EXAM OF HEAD AND NECK: CPT

## 2024-05-10 ENCOUNTER — TELEPHONE (OUTPATIENT)
Dept: FAMILY MEDICINE CLINIC | Age: 54
End: 2024-05-10

## 2024-05-10 NOTE — TELEPHONE ENCOUNTER
Pt having difficulty getting rybelsus filled, needs prior auth, CHERI Vuong is currently working on it and pt has enough to get her through the weekend as well as the office being able to provide samples till pharmacy can fill. Please update pt with status when able to.

## 2024-05-10 NOTE — TELEPHONE ENCOUNTER
Pt needs [facility], but want the script filled by [facility] because they accept the coupon pt uses to afford medication

## 2024-05-15 ENCOUNTER — TELEPHONE (OUTPATIENT)
Dept: ADMINISTRATIVE | Age: 54
End: 2024-05-15

## 2024-05-17 NOTE — TELEPHONE ENCOUNTER
Message from [employer] [facility]: This request has been approved using information available on the patient's profile. Case Id:[id number]   Coverage Start Date:04/15/2024  Coverage End Date:05/15/2025    If this requires a response please respond to the pool ( P MHCX [facility] MEDICATION PRE-AUTH).      Thank you please advise patient.    
Submitted PA for Rybelsus 7MG tablets  Via CMM Key: [id number]  STATUS: PENDING.    Follow up done daily; if no decision with in three days we will refax.  If another three days goes by with no decision will call the insurance for status.    
[phone]

## 2024-05-22 PROBLEM — Z00.00 WELL ADULT EXAM: Status: RESOLVED | Noted: 2021-03-26 | Resolved: 2024-05-22

## 2024-07-11 RX ORDER — METFORMIN HYDROCHLORIDE 500 MG/1
TABLET, EXTENDED RELEASE ORAL
Qty: 360 TABLET | Refills: 0 | Status: SHIPPED | OUTPATIENT
Start: 2024-07-11

## 2024-08-06 ENCOUNTER — OFFICE VISIT (OUTPATIENT)
Dept: FAMILY MEDICINE CLINIC | Age: 54
End: 2024-08-06
Payer: COMMERCIAL

## 2024-08-06 VITALS
SYSTOLIC BLOOD PRESSURE: 138 MMHG | DIASTOLIC BLOOD PRESSURE: 82 MMHG | BODY MASS INDEX: 26.68 KG/M2 | OXYGEN SATURATION: 99 % | HEIGHT: 62 IN | WEIGHT: 145 LBS | HEART RATE: 94 BPM

## 2024-08-06 DIAGNOSIS — R22.41 MASS OF RIGHT LOWER EXTREMITY: Primary | ICD-10-CM

## 2024-08-06 DIAGNOSIS — I10 ESSENTIAL HYPERTENSION, BENIGN: ICD-10-CM

## 2024-08-06 PROBLEM — R22.40 LEG MASS: Status: ACTIVE | Noted: 2024-08-06

## 2024-08-06 PROCEDURE — 3075F SYST BP GE 130 - 139MM HG: CPT | Performed by: FAMILY MEDICINE

## 2024-08-06 PROCEDURE — 3079F DIAST BP 80-89 MM HG: CPT | Performed by: FAMILY MEDICINE

## 2024-08-06 PROCEDURE — 99213 OFFICE O/P EST LOW 20 MIN: CPT | Performed by: FAMILY MEDICINE

## 2024-08-06 ASSESSMENT — ENCOUNTER SYMPTOMS
ABDOMINAL PAIN: 0
COUGH: 0
BLURRED VISION: 0
SHORTNESS OF BREATH: 0
NAUSEA: 0
CHANGE IN BOWEL HABIT: 0
VISUAL CHANGE: 0
ORTHOPNEA: 0
VOMITING: 0
SWOLLEN GLANDS: 0
SORE THROAT: 0

## 2024-08-06 NOTE — PROGRESS NOTES
leg: No edema.      Left lower leg: No edema.   Lymphadenopathy:      Cervical: No cervical adenopathy.   Skin:     General: Skin is warm and dry.      Coloration: Skin is not pale.      Findings: No erythema or rash.      Comments: 1 cm slightly blue mass in subcu space-lateral aspect of R thigh   Neurological:      Mental Status: She is alert and oriented to person, place, and time.      Cranial Nerves: No cranial nerve deficit.      Motor: No weakness or abnormal muscle tone.      Coordination: Coordination normal.      Gait: Gait normal.      Deep Tendon Reflexes: Reflexes normal.   Psychiatric:         Mood and Affect: Mood normal.         Behavior: Behavior normal.         Thought Content: Thought content normal.         Judgment: Judgment normal.         Assessment and Plan:     Leg mass   Unclear control, changes made today: will get u/s --likely vessel or bruise    Essential hypertension, benign   Well-controlled, continue current medications

## 2024-08-13 ENCOUNTER — HOSPITAL ENCOUNTER (OUTPATIENT)
Dept: ULTRASOUND IMAGING | Age: 54
Discharge: HOME | End: 2024-08-13
Attending: FAMILY MEDICINE
Payer: COMMERCIAL

## 2024-08-13 DIAGNOSIS — R22.41 MASS OF RIGHT LOWER EXTREMITY: ICD-10-CM

## 2024-08-13 PROCEDURE — 76999 ECHO EXAMINATION PROCEDURE: CPT

## 2024-10-02 RX ORDER — METFORMIN HCL 500 MG
TABLET, EXTENDED RELEASE 24 HR ORAL
Qty: 360 TABLET | Refills: 3 | Status: SHIPPED | OUTPATIENT
Start: 2024-10-02

## 2025-02-28 RX ORDER — HYDROCORTISONE 25 MG/G
CREAM TOPICAL
Qty: 30 G | Refills: 0 | Status: SHIPPED | OUTPATIENT
Start: 2025-02-28

## 2025-02-28 RX ORDER — ORAL SEMAGLUTIDE 7 MG/1
TABLET ORAL
Qty: 90 TABLET | Refills: 3 | Status: SHIPPED | OUTPATIENT
Start: 2025-02-28

## 2025-03-06 ENCOUNTER — TELEPHONE (OUTPATIENT)
Dept: FAMILY MEDICINE CLINIC | Age: 55
End: 2025-03-06

## 2025-03-06 NOTE — TELEPHONE ENCOUNTER
[phone] (home)     Patient called to inform clinical staff that her insurance has changed. Due to the change, this medication is no longer covered unless she uses a mail order pharmacy service. They needed to send forms over for us to fill out.     [employer] program phone #[phone]     She stated that the pharmacy tried to get in contact about this issue a week ago but there are no records of such an incident in her chart. She has 5 days of this medication left and she is worried that the documents the pharmacy service is sending over for Dr. Hyman to fill out will take longer than that.     Is there an alternative that can be prescribed to her in the meanwhile or an emergency supply of samples that she can come and get at the office? Please reach out to the patient at the number above to let her know either way.     Semaglutide (RYBELSUS) 7 MG TABS  TAKE 1 TABLET BY MOUTH EVERY DAY ON AN EMPTY STOMACH WITH NO MORE THAN 4OZ OF PLAIN WATER. WAIT 30 MINUTES BEFORE FOOD/DRINK/OTHER MEDS, Disp-90 tablet, R-3  Normal, Last Dose: Not Recorded  Refills: 3 ordered      [id number] [address] [facility] [phone] - F [phone]

## 2025-03-06 NOTE — TELEPHONE ENCOUNTER
[phone] (home)     Patient called with an update on this matter. She stated that [facility] will be sending over a fax with the forms to fill out to get this prescription covered and refilled.     The prescription service informed her that Dr. Hyman can check the cover my meds website and that the forms will have a 90 day supply request to send to [facility].     Please call the patient at the above number if there are any questions or concerns, or to let her know when the forms are received.

## 2025-03-11 ENCOUNTER — TELEPHONE (OUTPATIENT)
Dept: ADMINISTRATIVE | Age: 55
End: 2025-03-11

## 2025-03-11 NOTE — TELEPHONE ENCOUNTER
Pt requested to have ice water and warm blanket.  Provided to Pt.  Pt also requesting meds for her cough.  Physician made aware.   Submitted PA for Rybelsus 7MG tablets  Via [facility] (Key: [id number] STATUS: ARCHIVED.     Please call the payer at [phone] to complete this PA. Member # [id number]. Called and spoke with Faith. Requested that I go to [employer] site to complete. States there is a transfer Authorization on file that is good until 5/25/25 and shows there was a pharmacy pd claim on 03/06/25. The PA has been initiated on website. EOC ID is [id number]    Follow up done daily; if no decision with in three days we will refax.  If another three days goes by with no decision will call the insurance for status.

## 2025-03-20 NOTE — TELEPHONE ENCOUNTER
Called and spoke with Timothy at [facility]. States he is able to see the PA but still says in process. He provided me with a Ticket # [id number].

## 2025-03-21 NOTE — TELEPHONE ENCOUNTER
Pt called stating she was told the papers were signed and faxed over  Friday 3/14/25 but [facility]  has not gotten the paperwork. She stated the medications were sent in by Dr Bravo to [facility] but insurance will not let her go through [facility]'s she has to go through the mail order pharmacy. Pt stated she wants a 90 day supply and someone from the clinical staff.    [phone] (home)

## 2025-03-21 NOTE — TELEPHONE ENCOUNTER
Spoke with Tania today and let her know that we did get the paperwork and it was filled out and signed by Dr. Hyman and was faxed over. I also see through patient's chart that the PA was also started online by the PA department and as of 3-20-25 it was still in process and let the patient know this information as well. We are currently out of samples of this medication in our office. The medication was originally sent in by Dr. Bravo because she was the covering doctor when Dr. Hyman was out of the office.

## 2025-03-27 NOTE — TELEPHONE ENCOUNTER
Called to check status of PA request. Spoke with Diane. States a fax was submitted requesting add'l information but was never submitted back. I requested that fax be sent to me and will get it submitted back.

## 2025-03-29 RX ORDER — ATENOLOL 25 MG/1
25 TABLET ORAL DAILY
Qty: 90 TABLET | Refills: 2 | Status: SHIPPED | OUTPATIENT
Start: 2025-03-29

## 2025-03-29 RX ORDER — LEVOTHYROXINE SODIUM 50 UG/1
50 TABLET ORAL DAILY
Qty: 90 TABLET | Refills: 2 | Status: SHIPPED | OUTPATIENT
Start: 2025-03-29

## 2025-04-28 ENCOUNTER — OFFICE VISIT (OUTPATIENT)
Dept: FAMILY MEDICINE CLINIC | Age: 55
End: 2025-04-28
Payer: COMMERCIAL

## 2025-04-28 VITALS
HEART RATE: 85 BPM | DIASTOLIC BLOOD PRESSURE: 80 MMHG | SYSTOLIC BLOOD PRESSURE: 130 MMHG | BODY MASS INDEX: 26.52 KG/M2 | HEIGHT: 62 IN | OXYGEN SATURATION: 98 %

## 2025-04-28 DIAGNOSIS — E03.9 HYPOTHYROIDISM, UNSPECIFIED TYPE: ICD-10-CM

## 2025-04-28 DIAGNOSIS — Z00.00 ENCOUNTER FOR WELL ADULT EXAM WITHOUT ABNORMAL FINDINGS: ICD-10-CM

## 2025-04-28 DIAGNOSIS — E04.1 THYROID NODULE: ICD-10-CM

## 2025-04-28 DIAGNOSIS — K21.9 GASTROESOPHAGEAL REFLUX DISEASE WITHOUT ESOPHAGITIS: ICD-10-CM

## 2025-04-28 DIAGNOSIS — Z00.00 WELL ADULT EXAM: Primary | ICD-10-CM

## 2025-04-28 DIAGNOSIS — E78.2 MIXED HYPERLIPIDEMIA: ICD-10-CM

## 2025-04-28 DIAGNOSIS — I10 ESSENTIAL HYPERTENSION, BENIGN: ICD-10-CM

## 2025-04-28 DIAGNOSIS — E11.9 TYPE 2 DIABETES MELLITUS WITHOUT COMPLICATION, WITHOUT LONG-TERM CURRENT USE OF INSULIN (HCC): ICD-10-CM

## 2025-04-28 DIAGNOSIS — E55.9 VITAMIN D DEFICIENCY: ICD-10-CM

## 2025-04-28 DIAGNOSIS — E11.3291 MILD NONPROLIFERATIVE DIABETIC RETINOPATHY OF RIGHT EYE WITHOUT MACULAR EDEMA ASSOCIATED WITH TYPE 2 DIABETES MELLITUS (HCC): ICD-10-CM

## 2025-04-28 DIAGNOSIS — Z92.29 HISTORY OF TETANUS, DIPHTHERIA, AND ACELLULAR PERTUSSIS BOOSTER VACCINATION (TDAP): ICD-10-CM

## 2025-04-28 DIAGNOSIS — K57.30 COLON, DIVERTICULOSIS: ICD-10-CM

## 2025-04-28 PROCEDURE — 90471 IMMUNIZATION ADMIN: CPT | Performed by: FAMILY MEDICINE

## 2025-04-28 PROCEDURE — 90715 TDAP VACCINE 7 YRS/> IM: CPT | Performed by: FAMILY MEDICINE

## 2025-04-28 PROCEDURE — 3079F DIAST BP 80-89 MM HG: CPT | Performed by: FAMILY MEDICINE

## 2025-04-28 PROCEDURE — 3075F SYST BP GE 130 - 139MM HG: CPT | Performed by: FAMILY MEDICINE

## 2025-04-28 PROCEDURE — 99396 PREV VISIT EST AGE 40-64: CPT | Performed by: FAMILY MEDICINE

## 2025-04-28 SDOH — ECONOMIC STABILITY: FOOD INSECURITY: WITHIN THE PAST 12 MONTHS, THE FOOD YOU BOUGHT JUST DIDN'T LAST AND YOU DIDN'T HAVE MONEY TO GET MORE.: NEVER TRUE

## 2025-04-28 SDOH — ECONOMIC STABILITY: FOOD INSECURITY: WITHIN THE PAST 12 MONTHS, YOU WORRIED THAT YOUR FOOD WOULD RUN OUT BEFORE YOU GOT MONEY TO BUY MORE.: NEVER TRUE

## 2025-04-28 ASSESSMENT — ENCOUNTER SYMPTOMS
RHINORRHEA: 0
SHORTNESS OF BREATH: 0
COUGH: 0
SORE THROAT: 0
NAUSEA: 0
WHEEZING: 0
ABDOMINAL PAIN: 0
STRIDOR: 0
FACIAL SWELLING: 0
DIARRHEA: 0
VOICE CHANGE: 0
COLOR CHANGE: 0
CHOKING: 0
RECTAL PAIN: 0
EYE ITCHING: 0
EYE DISCHARGE: 0
PHOTOPHOBIA: 0
CHEST TIGHTNESS: 0
EYE PAIN: 0
BLOOD IN STOOL: 0
TROUBLE SWALLOWING: 0
ALLERGIC/IMMUNOLOGIC NEGATIVE: 1
BACK PAIN: 0
ABDOMINAL DISTENTION: 0
EYE REDNESS: 0
SINUS PRESSURE: 0
ANAL BLEEDING: 0
APNEA: 0
CONSTIPATION: 0
VOMITING: 0

## 2025-04-28 ASSESSMENT — PATIENT HEALTH QUESTIONNAIRE - PHQ9
SUM OF ALL RESPONSES TO PHQ QUESTIONS 1-9: 0
2. FEELING DOWN, DEPRESSED OR HOPELESS: NOT AT ALL
SUM OF ALL RESPONSES TO PHQ QUESTIONS 1-9: 0
1. LITTLE INTEREST OR PLEASURE IN DOING THINGS: NOT AT ALL
SUM OF ALL RESPONSES TO PHQ QUESTIONS 1-9: 0
SUM OF ALL RESPONSES TO PHQ QUESTIONS 1-9: 0

## 2025-04-28 NOTE — PROGRESS NOTES
MD   hydrocortisone 2.5 % cream APPLY TO THE AFFECTED AREA TWICE DAILY Yes Charlette Bravo MD   metFORMIN (GLUCOPHAGE-XR) 500 MG extended release tablet TAKE 2 TABLETS BY MOUTH TWICE DAILY BEFORE MEALS Yes Felix Hyman MD   vitamin D (CHOLECALCIFEROL) 1000 UNIT TABS tablet Take 1 tablet by mouth daily Yes Provider, MD Loenila   omeprazole (PRILOSEC) 40 MG capsule TAKE ONE CAPSULE BY MOUTH DAILY Yes Rodger Barton MD     Past Medical History:   Diagnosis Date    Colon, diverticulosis 2019    Diabetes mellitus (HCC) 10/11/2013    Erosive esophagitis 10/2011    Per EGD:Dr. Marshall    Esophageal ring     Essential hypertension, benign 2012    GERD (gastroesophageal reflux disease)     Goiter Reaction 2012    Hemangioma 2012    Hepatic hemangioma 2012    large hypodensity in the poster segment of the right liver the  liver.     Hiatal hernia:small 2011    Hyperlipidemia 2012    Hypothyroidism 2013    under care of ENT:Dr. Morrow    IFG (impaired fasting glucose) 2012    Meningioma (HCC) 2012    Dr. Duran([facility]):    Meningioma (HCC)     Migraine     Ovarian cyst     Pap smear 2010;3/2011;3/2012;2014    Nml.:Under care of gyn    S/P endoscopy 10/12/11    Dr. Black:erosive esophagitis/small hiatal hernia/esophageal ring dilated t020mm    Schatzki's ring 2011    10/2011 dilated per GI:Dr. Black    Screening mammogram 2010;2011;2012;13    Nml. 2013:Proscan=nml.    Thyroid follicular adenoma 2011    Thyroid nodule:follicular 2011    Thyroid nodules:largest=5.2cm x3.4cm 2011    Left lobe=1.5-1.2cm several nodules. Right lobe=1 large nodule:5.2cm x3.4cm nodule:bx=follicular tissue    Uncontrolled diabetes mellitus type 2 without complications 3/7/2013     Past Surgical History:   Procedure Laterality Date    BRAIN SURGERY  2019     SECTION  94,98    CHG US GUIDANCE NEEDLE PLACEMENT IMG S&I      benign--sees

## 2025-04-29 ENCOUNTER — RESULTS FOLLOW-UP (OUTPATIENT)
Dept: FAMILY MEDICINE CLINIC | Age: 55
End: 2025-04-29

## 2025-04-29 LAB
25(OH)D3 SERPL-MCNC: 31.7 NG/ML
ALBUMIN SERPL-MCNC: 4.7 G/DL (ref 3.4–5)
ALBUMIN/GLOB SERPL: 2.4 {RATIO} (ref 1.1–2.2)
ALP SERPL-CCNC: 85 U/L (ref 40–129)
ALT SERPL-CCNC: 32 U/L (ref 10–40)
ANION GAP SERPL CALCULATED.3IONS-SCNC: 10 MMOL/L (ref 3–16)
AST SERPL-CCNC: 28 U/L (ref 15–37)
BASOPHILS # BLD: 0 K/UL (ref 0–0.2)
BASOPHILS NFR BLD: 0.6 %
BILIRUB SERPL-MCNC: 0.4 MG/DL (ref 0–1)
BUN SERPL-MCNC: 10 MG/DL (ref 7–20)
CALCIUM SERPL-MCNC: 9.4 MG/DL (ref 8.3–10.6)
CHLORIDE SERPL-SCNC: 104 MMOL/L (ref 99–110)
CHOLEST SERPL-MCNC: 144 MG/DL (ref 0–199)
CO2 SERPL-SCNC: 27 MMOL/L (ref 21–32)
CREAT SERPL-MCNC: 0.7 MG/DL (ref 0.6–1.1)
CREAT UR-MCNC: 146 MG/DL (ref 28–259)
DEPRECATED RDW RBC AUTO: 14.3 % (ref 12.4–15.4)
EOSINOPHIL # BLD: 0.1 K/UL (ref 0–0.6)
EOSINOPHIL NFR BLD: 2.2 %
EST. AVERAGE GLUCOSE BLD GHB EST-MCNC: 165.7 MG/DL
GFR SERPLBLD CREATININE-BSD FMLA CKD-EPI: >90 ML/MIN/{1.73_M2}
GLUCOSE SERPL-MCNC: 134 MG/DL (ref 70–99)
HBA1C MFR BLD: 7.4 %
HCT VFR BLD AUTO: 35.1 % (ref 36–48)
HDLC SERPL-MCNC: 30 MG/DL (ref 40–60)
HGB BLD-MCNC: 12 G/DL (ref 12–16)
LDLC SERPL CALC-MCNC: 80 MG/DL
LYMPHOCYTES # BLD: 1.6 K/UL (ref 1–5.1)
LYMPHOCYTES NFR BLD: 30 %
MAGNESIUM SERPL-MCNC: 1.56 MG/DL (ref 1.8–2.4)
MCH RBC QN AUTO: 27.3 PG (ref 26–34)
MCHC RBC AUTO-ENTMCNC: 34.1 G/DL (ref 31–36)
MCV RBC AUTO: 80 FL (ref 80–100)
MICROALBUMIN UR DL<=1MG/L-MCNC: <1.2 MG/DL
MICROALBUMIN/CREAT UR: NORMAL MG/G (ref 0–30)
MONOCYTES # BLD: 0.3 K/UL (ref 0–1.3)
MONOCYTES NFR BLD: 5.8 %
NEUTROPHILS # BLD: 3.2 K/UL (ref 1.7–7.7)
NEUTROPHILS NFR BLD: 61.4 %
PLATELET # BLD AUTO: 209 K/UL (ref 135–450)
PMV BLD AUTO: 8.2 FL (ref 5–10.5)
POTASSIUM SERPL-SCNC: 4.2 MMOL/L (ref 3.5–5.1)
PROT SERPL-MCNC: 6.7 G/DL (ref 6.4–8.2)
RBC # BLD AUTO: 4.39 M/UL (ref 4–5.2)
SODIUM SERPL-SCNC: 141 MMOL/L (ref 136–145)
T4 FREE SERPL-MCNC: 1.2 NG/DL (ref 0.9–1.8)
TRIGL SERPL-MCNC: 169 MG/DL (ref 0–150)
TSH SERPL DL<=0.005 MIU/L-ACNC: 1.39 UIU/ML (ref 0.27–4.2)
VIT B12 SERPL-MCNC: 223 PG/ML (ref 211–911)
VLDLC SERPL CALC-MCNC: 34 MG/DL
WBC # BLD AUTO: 5.2 K/UL (ref 4–11)

## (undated) DEVICE — GARMENT,MEDLINE,DVT,INT,CALF,MED, GEN2: Brand: MEDLINE

## (undated) DEVICE — SOLUTION IV 1000ML 0.9% SOD CHL

## (undated) DEVICE — TOOL 9BA60 LEGEND 9CM 6MM BA: Brand: MIDAS REX

## (undated) DEVICE — 3L THIN WALL CAN: Brand: CRD

## (undated) DEVICE — HOOK RETRCT 12MM S STL BLNT E STAY LONE STAR

## (undated) DEVICE — 3M™ TEGADERM™ TRANSPARENT FILM DRESSING FRAME STYLE, 1624W, 2-3/8 IN X 2-3/4 IN (6 CM X 7 CM), 100/CT 4CT/CASE: Brand: 3M™ TEGADERM™

## (undated) DEVICE — TURNOVER KIT RM INF CTRL TECH

## (undated) DEVICE — SOLUTION IV 500ML 0.9% SOD CHL PH 5 INJ USP VIAFLX PLAS

## (undated) DEVICE — CODMAN® SURGICAL PATTIES 1/2" X 1/2" (1.27CM X 1.27CM): Brand: CODMAN®

## (undated) DEVICE — SUTURE VCRL SZ 2-0 L18IN ABSRB UD CT-1 L36MM 1/2 CIR J839D

## (undated) DEVICE — SPONGE GZ W4XL4IN COT 12 PLY TYP VII WVN C FLD DSGN

## (undated) DEVICE — NEURO SPONGES: Brand: DEROYAL

## (undated) DEVICE — PAD,NON-ADHERENT,3X8,STERILE,LF,1/PK: Brand: MEDLINE

## (undated) DEVICE — CHLORAPREP 26ML ORANGE

## (undated) DEVICE — SPONGE,LAP,18"X18",DLX,XR,ST,5/PK,40/PK: Brand: MEDLINE

## (undated) DEVICE — BRAIN LAB DISP SPHERES

## (undated) DEVICE — ELECTRODE NERVE STIM FOR SPNL CRD MONITORING

## (undated) DEVICE — GLOVE SURG SZ 7.5 L11.2IN THK9.8MIL STRW LTX POLYMER BEAD

## (undated) DEVICE — SURE SET-DOUBLE BASIN-LF: Brand: MEDLINE INDUSTRIES, INC.

## (undated) DEVICE — TOOL F2/8TA23 LEGEND 8CM 2.3MM TAPER: Brand: MIDAS REX™

## (undated) DEVICE — SUTURE NRLN SZ 4-0 L18IN NONABSORBABLE BLK L13MM TF 1/2 CIR C584D

## (undated) DEVICE — COTTON BALLS, DOUBLE STRUNG: Brand: DEROYAL

## (undated) DEVICE — BLADE CLIPPER SURG SENSICLIP

## (undated) DEVICE — CODMAN® SURGICAL PATTIES 1/4" X 1/4" (0.64CM X 0.64CM): Brand: CODMAN®

## (undated) DEVICE — STAPLER SKIN H3.9MM WIRE DIA0.58MM CRWN 6.9MM 35 STPL ROT

## (undated) DEVICE — 3M™ WARMING BLANKET, LOWER BODY, 10 PER CASE, 42568: Brand: BAIR HUGGER™

## (undated) DEVICE — SURGICAL SET UP - SURE SET: Brand: MEDLINE INDUSTRIES, INC.

## (undated) DEVICE — SKIN AFFIX SURG ADHESIVE 72/CS 0.55ML: Brand: MEDLINE

## (undated) DEVICE — DRAPE IRRIG FLD WRM W44XL44IN W/ AORN STD PRTBL INTRATEMP

## (undated) DEVICE — TOTAL TRAY, 16FR 10ML SIL FOLEY, URN: Brand: MEDLINE

## (undated) DEVICE — PRE OP PACK: Brand: MEDLINE INDUSTRIES, INC.

## (undated) DEVICE — JEWISH CRANI PACK: Brand: MEDLINE INDUSTRIES, INC.

## (undated) DEVICE — DRESSING GERM DIA1IN CNTR H DIA7MM BLU CHG ANTIMIC PROTCT

## (undated) DEVICE — PROTECTOR UNIV FOAM EXTREMITY DEVON

## (undated) DEVICE — GOWN,SIRUS,POLYRNF,SETINSLV,L,20/CS: Brand: MEDLINE

## (undated) DEVICE — INTENDED USE FOR SURGICAL MARKING ON INTACT SKIN, ALSO PROVIDES A PERMANENT METHOD OF IDENTIFYING OBJECTS IN THE OPERATING ROOM: Brand: WRITESITE® PLUS MINI PREP RESISTANT MARKER

## (undated) DEVICE — GOWN,SIRUS,POLYRNF,SETINSLV,XL,20/CS: Brand: MEDLINE

## (undated) DEVICE — MAYFIELD® DISPOSABLE ADULT SKULL PIN (PLASTIC BASE): Brand: MAYFIELD®

## (undated) DEVICE — ELECTRODE PT RET AD L9FT HI MOIST COND ADH HYDRGEL CORDED

## (undated) DEVICE — COVER LT HNDL CAM BLU DISP W/ SURG CTRL

## (undated) DEVICE — AGENT HEMSTAT W2XL4IN OXIDIZED REGENERATED CELOS ABSRB SFT